# Patient Record
Sex: MALE | Race: WHITE | NOT HISPANIC OR LATINO | URBAN - METROPOLITAN AREA
[De-identification: names, ages, dates, MRNs, and addresses within clinical notes are randomized per-mention and may not be internally consistent; named-entity substitution may affect disease eponyms.]

---

## 2017-01-25 ENCOUNTER — OFFICE VISIT (OUTPATIENT)
Dept: FAMILY MEDICINE | Facility: CLINIC | Age: 67
End: 2017-01-25
Payer: MEDICARE

## 2017-01-25 ENCOUNTER — RADIANT APPOINTMENT (OUTPATIENT)
Dept: GENERAL RADIOLOGY | Facility: CLINIC | Age: 67
End: 2017-01-25
Attending: FAMILY MEDICINE
Payer: MEDICARE

## 2017-01-25 VITALS
DIASTOLIC BLOOD PRESSURE: 77 MMHG | WEIGHT: 215 LBS | HEART RATE: 90 BPM | BODY MASS INDEX: 28.49 KG/M2 | OXYGEN SATURATION: 98 % | TEMPERATURE: 98 F | HEIGHT: 73 IN | SYSTOLIC BLOOD PRESSURE: 112 MMHG

## 2017-01-25 DIAGNOSIS — Z13.6 CARDIOVASCULAR SCREENING; LDL GOAL LESS THAN 100: ICD-10-CM

## 2017-01-25 DIAGNOSIS — Z12.5 SCREENING FOR PROSTATE CANCER: ICD-10-CM

## 2017-01-25 DIAGNOSIS — R73.01 IMPAIRED FASTING GLUCOSE: ICD-10-CM

## 2017-01-25 DIAGNOSIS — R05.9 COUGH: ICD-10-CM

## 2017-01-25 DIAGNOSIS — R05.9 COUGH: Primary | ICD-10-CM

## 2017-01-25 DIAGNOSIS — Z11.9 SCREENING EXAMINATION FOR INFECTIOUS DISEASE: ICD-10-CM

## 2017-01-25 DIAGNOSIS — J20.9 ACUTE BRONCHITIS, UNSPECIFIED ORGANISM: ICD-10-CM

## 2017-01-25 DIAGNOSIS — R53.83 FATIGUE, UNSPECIFIED TYPE: ICD-10-CM

## 2017-01-25 LAB
ALBUMIN SERPL-MCNC: 3.8 G/DL (ref 3.4–5)
ALP SERPL-CCNC: 112 U/L (ref 40–150)
ALT SERPL W P-5'-P-CCNC: 25 U/L (ref 0–70)
ANION GAP SERPL CALCULATED.3IONS-SCNC: 10 MMOL/L (ref 3–14)
AST SERPL W P-5'-P-CCNC: 18 U/L (ref 0–45)
BASOPHILS # BLD AUTO: 0 10E9/L (ref 0–0.2)
BASOPHILS NFR BLD AUTO: 0.2 %
BILIRUB SERPL-MCNC: 0.5 MG/DL (ref 0.2–1.3)
BUN SERPL-MCNC: 16 MG/DL (ref 7–30)
CALCIUM SERPL-MCNC: 9.4 MG/DL (ref 8.5–10.1)
CHLORIDE SERPL-SCNC: 102 MMOL/L (ref 94–109)
CHOLEST SERPL-MCNC: 171 MG/DL
CO2 SERPL-SCNC: 26 MMOL/L (ref 20–32)
CREAT SERPL-MCNC: 1 MG/DL (ref 0.66–1.25)
DIFFERENTIAL METHOD BLD: NORMAL
EOSINOPHIL # BLD AUTO: 0.1 10E9/L (ref 0–0.7)
EOSINOPHIL NFR BLD AUTO: 0.5 %
ERYTHROCYTE [DISTWIDTH] IN BLOOD BY AUTOMATED COUNT: 13.2 % (ref 10–15)
GFR SERPL CREATININE-BSD FRML MDRD: 75 ML/MIN/1.7M2
GLUCOSE SERPL-MCNC: 106 MG/DL (ref 70–99)
HBA1C MFR BLD: 5.9 % (ref 4.3–6)
HCT VFR BLD AUTO: 46.8 % (ref 40–53)
HCV AB SERPL QL IA: NORMAL
HDLC SERPL-MCNC: 42 MG/DL
HGB BLD-MCNC: 16 G/DL (ref 13.3–17.7)
LDLC SERPL CALC-MCNC: 106 MG/DL
LYMPHOCYTES # BLD AUTO: 2 10E9/L (ref 0.8–5.3)
LYMPHOCYTES NFR BLD AUTO: 18.1 %
MCH RBC QN AUTO: 29.6 PG (ref 26.5–33)
MCHC RBC AUTO-ENTMCNC: 34.2 G/DL (ref 31.5–36.5)
MCV RBC AUTO: 87 FL (ref 78–100)
MONOCYTES # BLD AUTO: 0.8 10E9/L (ref 0–1.3)
MONOCYTES NFR BLD AUTO: 7.8 %
NEUTROPHILS # BLD AUTO: 7.9 10E9/L (ref 1.6–8.3)
NEUTROPHILS NFR BLD AUTO: 73.4 %
NONHDLC SERPL-MCNC: 129 MG/DL
PLATELET # BLD AUTO: 233 10E9/L (ref 150–450)
POTASSIUM SERPL-SCNC: 4.5 MMOL/L (ref 3.4–5.3)
PROT SERPL-MCNC: 8.1 G/DL (ref 6.8–8.8)
PSA SERPL-ACNC: 4.92 UG/L (ref 0–4)
RBC # BLD AUTO: 5.4 10E12/L (ref 4.4–5.9)
SODIUM SERPL-SCNC: 138 MMOL/L (ref 133–144)
TRIGL SERPL-MCNC: 115 MG/DL
TSH SERPL DL<=0.005 MIU/L-ACNC: 0.59 MU/L (ref 0.4–4)
WBC # BLD AUTO: 10.8 10E9/L (ref 4–11)

## 2017-01-25 PROCEDURE — G0103 PSA SCREENING: HCPCS | Performed by: FAMILY MEDICINE

## 2017-01-25 PROCEDURE — 83036 HEMOGLOBIN GLYCOSYLATED A1C: CPT | Performed by: FAMILY MEDICINE

## 2017-01-25 PROCEDURE — 80053 COMPREHEN METABOLIC PANEL: CPT | Performed by: FAMILY MEDICINE

## 2017-01-25 PROCEDURE — 71020 XR CHEST 2 VW: CPT

## 2017-01-25 PROCEDURE — 36415 COLL VENOUS BLD VENIPUNCTURE: CPT | Performed by: FAMILY MEDICINE

## 2017-01-25 PROCEDURE — 84443 ASSAY THYROID STIM HORMONE: CPT | Performed by: FAMILY MEDICINE

## 2017-01-25 PROCEDURE — 86803 HEPATITIS C AB TEST: CPT | Performed by: FAMILY MEDICINE

## 2017-01-25 PROCEDURE — 99214 OFFICE O/P EST MOD 30 MIN: CPT | Performed by: FAMILY MEDICINE

## 2017-01-25 PROCEDURE — 80061 LIPID PANEL: CPT | Performed by: FAMILY MEDICINE

## 2017-01-25 PROCEDURE — 85025 COMPLETE CBC W/AUTO DIFF WBC: CPT | Performed by: FAMILY MEDICINE

## 2017-01-25 RX ORDER — PREDNISONE 10 MG/1
20 TABLET ORAL DAILY
Qty: 14 TABLET | Refills: 1 | Status: SHIPPED | OUTPATIENT
Start: 2017-01-25 | End: 2017-02-01

## 2017-01-25 RX ORDER — AZITHROMYCIN 250 MG/1
TABLET, FILM COATED ORAL
Qty: 6 TABLET | Refills: 1 | Status: SHIPPED | OUTPATIENT
Start: 2017-01-25 | End: 2017-05-30

## 2017-01-25 ASSESSMENT — PAIN SCALES - GENERAL: PAINLEVEL: NO PAIN (0)

## 2017-01-25 NOTE — PROGRESS NOTES
SUBJECTIVE:                                                    eRnan Rossi is a 66 year old male who presents to clinic today for the following health issues:       ENT Symptoms             Symptoms: cc Present Absent Comment   Fever/Chills   x    Fatigue  x     Muscle Aches  x     Eye Irritation   x    Sneezing  x     Nasal Russ/Drg  x     Sinus Pressure/Pain   x    Loss of smell  x     Dental pain   x    Sore Throat   x    Swollen Glands   x    Ear Pain/Fullness   x    Cough  x     Wheeze  x     Chest Pain   x    Shortness of breath   x    Rash   x    Other         Symptom duration:  6 days   Symptom severity:  moderate   Treatments tried:  mucinex   Contacts:  none              Problem list and histories reviewed & adjusted, as indicated.  Additional history: as documented             HPI      ROS  Itchy throat initially    Then chest     Coughing up yellow/ green    No blood    Clear nasal discharge    Chest bothers patient more    Patient also would like labs done today.  Fasting.    Patient just got back from [place].    Physical Exam   Constitutional: He is oriented to person, place, and time and well-developed, well-nourished, and in no distress. No distress.   HENT:   Head: Normocephalic and atraumatic.   Right Ear: External ear normal.   Left Ear: External ear normal.   Mouth/Throat: Oropharynx is clear and moist.   Minimal nasal discharge.  Some drainage down back of oropharynx.  No sinus/ submandib tenderness   Eyes: Conjunctivae are normal.   Neck: Neck supple. Carotid bruit is not present.   Cardiovascular: Normal rate, regular rhythm, normal heart sounds and intact distal pulses.  Exam reveals no gallop and no friction rub.    No murmur heard.  Pulmonary/Chest: Effort normal. No respiratory distress. He has no wheezes. He has no rales. He exhibits no tenderness.   Coarse breath sounds all fields   Abdominal: Soft. There is no tenderness.   No back or costovertebral angle tenderness      Musculoskeletal: He exhibits no edema.   Lymphadenopathy:     He has no cervical adenopathy.   Neurological: He is alert and oriented to person, place, and time.   Skin: He is not diaphoretic.   Psychiatric: Mood and affect normal.       cxr showed no obvious infiltrate.  A bit of fluid in the fissures.  No masses seen.    ASSESSMENT / PLAN:  (R05) Cough  (primary encounter diagnosis)  Comment: cxr as above   Plan: XR Chest 2 Views             (R73.01) Impaired fasting glucose  Comment: check   Plan: Hemoglobin A1c             (R53.83) Fatigue, unspecified type  Comment: check   Plan: TSH with free T4 reflex, Comprehensive         metabolic panel, CBC with platelets         differential             (Z11.9) Screening examination for infectious disease  Comment: check   Plan: Hepatitis C antibody             (Z13.6) CARDIOVASCULAR SCREENING; LDL GOAL LESS THAN 100  Comment: fasting today   Plan: Lipid panel reflex to direct LDL             (Z12.5) Screening for prostate cancer  Comment: elevated in past   Plan: Prostate spec antigen screen        Recheck     (J20.9) Acute bronchitis, unspecified organism  Comment: start with prednisone.  Then if symptoms worsen/ not improving can fill prescription for antibiotic   Plan: predniSONE (DELTASONE) 10 MG tablet,         azithromycin (ZITHROMAX) 250 MG tablet               I reviewed the patient's medications, allergies, medical history, family history, and social history.    Madhu José MD

## 2017-01-25 NOTE — MR AVS SNAPSHOT
"              After Visit Summary   1/25/2017    Renan Rossi    MRN: [id number]           Patient Information     Date Of Birth          1950        Visit Information        Provider Department      1/25/2017 7:00 AM Madhu José MD [facility]        Today's Diagnoses     Cough    -  1     Impaired fasting glucose         Fatigue, unspecified type         Screening examination for infectious disease         CARDIOVASCULAR SCREENING; LDL GOAL LESS THAN 100         Screening for prostate cancer         Acute bronchitis, unspecified organism           Care Instructions    Start prednisone    Hold antibiotics ; fill if symptoms worsen/ don't improve    We will send you lab results    Follow up as needed based on symptoms         Follow-ups after your visit        Who to contact     If you have questions or need follow up information about today's clinic visit or your schedule please contact [facility] directly at [phone].  Normal or non-critical lab and imaging results will be communicated to you by MyChart, letter or phone within 4 business days after the clinic has received the results. If you do not hear from us within 7 days, please contact the clinic through MyChart or phone. If you have a critical or abnormal lab result, we will notify you by phone as soon as possible.  Submit refill requests through [employer] or call your pharmacy and they will forward the refill request to us. Please allow 3 business days for your refill to be completed.          Additional Information About Your Visit        MyChart Information     [employer] lets you send messages to your doctor, view your test results, renew your prescriptions, schedule appointments and more. To sign up, go to [email or web address] . Click on \"Log in\" on the left side of the screen, which will take you to the Welcome page. Then click on \"Sign up Now\" on the right side of the page.     You will be asked " "to enter the access code listed below, as well as some personal information. Please follow the directions to create your username and password.     Your access code is: [email or web address]  Expires: 2017  7:49 AM     Your access code will  in 90 days. If you need help or a new code, please call your [facility] clinic or [phone].        Care EveryWhere ID     This is your Care EveryWhere ID. This could be used by other organizations to access your [facility] medical records  [id number]        Your Vitals Were     Pulse Temperature Height BMI (Body Mass Index) Pulse Oximetry       90 98  F (36.7  C) (Oral) 6' 1\" (1.854 m) 28.37 kg/m2 98%        Blood Pressure from Last 3 Encounters:   17 112/77   16 127/77   08/18/15 117/76    Weight from Last 3 Encounters:   17 215 lb (97.523 kg)   16 223 lb (101.152 kg)   08/18/15 223 lb (101.152 kg)              We Performed the Following     CBC with platelets differential     Comprehensive metabolic panel     Hemoglobin A1c     Hepatitis C antibody     Lipid panel reflex to direct LDL     Prostate spec antigen screen     TSH with free T4 reflex          Today's Medication Changes          These changes are accurate as of: 17  7:49 AM.  If you have any questions, ask your nurse or doctor.               Start taking these medicines.        Dose/Directions    azithromycin 250 MG tablet   Commonly known as:  ZITHROMAX   Used for:  Acute bronchitis, unspecified organism   Started by:  Madhu José MD        2 po daily x one day then 1 po daily x 4 days   Quantity:  6 tablet   Refills:  1       predniSONE 10 MG tablet   Commonly known as:  DELTASONE   Used for:  Acute bronchitis, unspecified organism   Started by:  Madhu José MD        Dose:  20 mg   Take 2 tablets (20 mg) by mouth daily for 7 days   Quantity:  14 tablet   Refills:  1            Where to get your medicines      These medications were sent to [id number] - " [place], [place] - [address]  [address], [place] [address]     Phone:  [phone]    - predniSONE 10 MG tablet      Some of these will need a paper prescription and others can be bought over the counter.  Ask your nurse if you have questions.     Bring a paper prescription for each of these medications    - azithromycin 250 MG tablet             Primary Care Provider Office Phone # Fax #    Madhu José [phone]       [facility] [address]  [facility] [address]        Thank you!     Thank you for choosing [facility]  for your care. Our goal is always to provide you with excellent care. Hearing back from our patients is one way we can continue to improve our services. Please take a few minutes to complete the written survey that you may receive in the mail after your visit with us. Thank you!             Your Updated Medication List - Protect others around you: Learn how to safely use, store and throw away your medicines at www.disposemymeds.org.          This list is accurate as of: 1/25/17  7:49 AM.  Always use your most recent med list.                   Brand Name Dispense Instructions for use    aspirin 81 MG tablet     30 tablet    Take 1 tablet (81 mg) by mouth daily       azithromycin 250 MG tablet    ZITHROMAX    6 tablet    2 po daily x one day then 1 po daily x 4 days       predniSONE 10 MG tablet    DELTASONE    14 tablet    Take 2 tablets (20 mg) by mouth daily for 7 days

## 2017-01-25 NOTE — PATIENT INSTRUCTIONS
Start prednisone    Hold antibiotics ; fill if symptoms worsen/ don't improve    We will send you lab results    Follow up as needed based on symptoms

## 2017-01-25 NOTE — Clinical Note
"[facility]  [address] [place]  [address]  [phone]        January 30, 2017    Renan Rossi  [address] [facility] [address]        Dear Renan,    The prostate test is better than last time.    Don't worry about the LDL \"bad\" cholesterol being marked as high.  The lab now considers normal less than 100, which is overly stringent.  Normal used to be less than 130.     The normal hemoglobin a1c means you do not have diabetes.    Other labs are okay also.    Results for orders placed or performed in visit on 01/25/17   Lipid panel reflex to direct LDL   Result Value Ref Range    Cholesterol 171 <200 mg/dL    Triglycerides 115 <150 mg/dL    HDL Cholesterol 42 >39 mg/dL    LDL Cholesterol Calculated 106 (H) <100 mg/dL    Non HDL Cholesterol 129 <130 mg/dL   Hemoglobin A1c   Result Value Ref Range    Hemoglobin A1C 5.9 4.3 - 6.0 %   TSH with free T4 reflex   Result Value Ref Range    TSH 0.59 0.40 - 4.00 mU/L   Prostate spec antigen screen   Result Value Ref Range    PSA 4.92 (H) 0 - 4 ug/L   Comprehensive metabolic panel   Result Value Ref Range    Sodium 138 133 - 144 mmol/L    Potassium 4.5 3.4 - 5.3 mmol/L    Chloride 102 94 - 109 mmol/L    Carbon Dioxide 26 20 - 32 mmol/L    Anion Gap 10 3 - 14 mmol/L    Glucose 106 (H) 70 - 99 mg/dL    Urea Nitrogen 16 7 - 30 mg/dL    Creatinine 1.00 0.66 - 1.25 mg/dL    GFR Estimate 75 >60 mL/min/1.7m2    GFR Estimate If Black >90   GFR Calc   >60 mL/min/1.7m2    Calcium 9.4 8.5 - 10.1 mg/dL    Bilirubin Total 0.5 0.2 - 1.3 mg/dL    Albumin 3.8 3.4 - 5.0 g/dL    Protein Total 8.1 6.8 - 8.8 g/dL    Alkaline Phosphatase 112 40 - 150 U/L    ALT 25 0 - 70 U/L    AST 18 0 - 45 U/L   CBC with platelets differential   Result Value Ref Range    WBC 10.8 4.0 - 11.0 10e9/L    RBC Count 5.40 4.4 - 5.9 10e12/L    Hemoglobin 16.0 13.3 - 17.7 g/dL    Hematocrit 46.8 40.0 - 53.0 %    MCV 87 78 - 100 fl    MCH 29.6 26.5 - " 33.0 pg    MCHC 34.2 31.5 - 36.5 g/dL    RDW 13.2 10.0 - 15.0 %    Platelet Count 233 150 - 450 10e9/L    Diff Method Automated Method     % Neutrophils 73.4 %    % Lymphocytes 18.1 %    % Monocytes 7.8 %    % Eosinophils 0.5 %    % Basophils 0.2 %    Absolute Neutrophil 7.9 1.6 - 8.3 10e9/L    Absolute Lymphocytes 2.0 0.8 - 5.3 10e9/L    Absolute Monocytes 0.8 0.0 - 1.3 10e9/L    Absolute Eosinophils 0.1 0.0 - 0.7 10e9/L    Absolute Basophils 0.0 0.0 - 0.2 10e9/L   Hepatitis C antibody   Result Value Ref Range    Hepatitis C Antibody  NR     Nonreactive   Assay performance characteristics have not been established for newborns,   infants, and children         If you have any questions please call the clinic at [phone].    Sincerely,    Madhu OWENSL

## 2017-01-25 NOTE — NURSING NOTE
"Chief Complaint   Patient presents with     Cold Symptoms     Health Maintenance     Other     bpa and my chart       Initial /77 mmHg  Pulse 90  Temp(Src) 98  F (36.7  C) (Oral)  Ht 6' 1\" (1.854 m)  Wt 215 lb (97.523 kg)  BMI 28.37 kg/m2  SpO2 98% Estimated body mass index is 28.37 kg/(m^2) as calculated from the following:    Height as of this encounter: 6' 1\" (1.854 m).    Weight as of this encounter: 215 lb (97.523 kg).  BP completed using cuff size: regular taken on the right arm  Linsey Lr CMA      "

## 2017-01-29 NOTE — PROGRESS NOTES
"Quick Note:    The prostate test is better than last time.    Don't worry about the LDL \"bad\" cholesterol being marked as high. The lab now considers normal less than 100, which is overly stringent. Normal used to be less than 130.     The normal hemoglobin a1c means you do not have diabetes.    Other labs are okay also.    Madhu José MD      ______  "

## 2017-05-30 ENCOUNTER — OFFICE VISIT (OUTPATIENT)
Dept: FAMILY MEDICINE | Facility: CLINIC | Age: 67
End: 2017-05-30
Payer: MEDICARE

## 2017-05-30 VITALS
HEART RATE: 83 BPM | BODY MASS INDEX: 28.5 KG/M2 | SYSTOLIC BLOOD PRESSURE: 130 MMHG | DIASTOLIC BLOOD PRESSURE: 78 MMHG | OXYGEN SATURATION: 98 % | TEMPERATURE: 97.3 F | WEIGHT: 216 LBS

## 2017-05-30 DIAGNOSIS — H61.23 BILATERAL IMPACTED CERUMEN: Primary | ICD-10-CM

## 2017-05-30 PROCEDURE — 99212 OFFICE O/P EST SF 10 MIN: CPT | Performed by: NURSE PRACTITIONER

## 2017-05-30 ASSESSMENT — PAIN SCALES - GENERAL: PAINLEVEL: NO PAIN (0)

## 2017-05-30 NOTE — PROGRESS NOTES
SUBJECTIVE:                                                    Renan Rossi is a 67 year old male who presents to clinic today for the following health issues:      Acute Illness   Acute illness concerns: Left ear  Onset: 2 dasy    Fever: no    Chills/Sweats: no    Headache (location?): no    Sinus Pressure:no    Conjunctivitis:  no    Ear Pain: feels plugged    Rhinorrhea: no    Congestion: YES- a little bit of nasal  congestion    Sore Throat: no     Cough: no    Wheeze: no    Decreased Appetite: no    Nausea: no    Vomiting: no    Diarrhea:  no    Dysuria/Freq.: no    Fatigue/Achiness: no    Sick/Strep Exposure: no     Therapies Tried and outcome: nothing    No pain. Just pressure. Thinks ears plugged with wax      Problem list and histories reviewed & adjusted, as indicated.  Additional history: none    Patient Active Problem List   Diagnosis     Advanced directives, counseling/discussion     Past Surgical History:   Procedure Laterality Date     C NONSPECIFIC PROCEDURE  1975    right kidney, blockage     COLONOSCOPY  11-21-13    Return for Colonoscopy In 1 Year     COLONOSCOPY  1-7-15    Return for Colonoscopy in 10 yrs       Social History   Substance Use Topics     Smoking status: Former Smoker     Types: Cigarettes     Quit date: 7/15/2012     Smokeless tobacco: Never Used     Alcohol use No     Family History   Problem Relation Age of Onset     Cancer - colorectal Mother            Reviewed and updated as needed this visit by clinical staff  Tobacco  Allergies  Med Hx  Surg Hx  Fam Hx  Soc Hx      Reviewed and updated as needed this visit by Provider         ROS:  Noncontributory except as above    OBJECTIVE:                                                    /78 (BP Location: Right arm, Patient Position: Chair, Cuff Size: Adult Large)  Pulse 83  Temp 97.3  F (36.3  C) (Oral)  Wt 216 lb (98 kg)  SpO2 98%  BMI 28.5 kg/m2  Body mass index is 28.5 kg/(m^2).  GENERAL: healthy, alert and no  distress  HENT: normal cephalic/atraumatic, both ears: impacted with cerumen. After lavage: normal: no effusions, no erythema, normal landmarks, nose and mouth without ulcers or lesions, oropharynx clear, oral mucous membranes moist and sinuses: not tender    Diagnostic Test Results:  none      ASSESSMENT/PLAN:                                                        ICD-10-CM    1. Bilateral impacted cerumen H61.23        Bilateral ears lavaged by MA, which he tolerated. Voiced resolution of symptoms afterwards.     CLARISA Emerson CNP  [facility]

## 2017-05-30 NOTE — MR AVS SNAPSHOT
"              After Visit Summary   2017    Renan Rossi    MRN: [id number]           Patient Information     Date Of Birth          1950        Visit Information        Provider Department      2017 9:40 AM Shameka Guthrie APRN CNP [facility]        Today's Diagnoses     Bilateral impacted cerumen    -  1       Follow-ups after your visit        Follow-up notes from your care team     Return if symptoms worsen or fail to improve.      Who to contact     If you have questions or need follow up information about today's clinic visit or your schedule please contact [facility] directly at [phone].  Normal or non-critical lab and imaging results will be communicated to you by MyChart, letter or phone within 4 business days after the clinic has received the results. If you do not hear from us within 7 days, please contact the clinic through MyChart or phone. If you have a critical or abnormal lab result, we will notify you by phone as soon as possible.  Submit refill requests through [employer] or call your pharmacy and they will forward the refill request to us. Please allow 3 business days for your refill to be completed.          Additional Information About Your Visit        MyChart Information     [employer] lets you send messages to your doctor, view your test results, renew your prescriptions, schedule appointments and more. To sign up, go to [email or web address] . Click on \"Log in\" on the left side of the screen, which will take you to the Welcome page. Then click on \"Sign up Now\" on the right side of the page.     You will be asked to enter the access code listed below, as well as some personal information. Please follow the directions to create your username and password.     Your access code is: [email or web address]  Expires: 2017 10:13 AM     Your access code will  in 90 days. If you need help or a new code, please call your [facility] " clinic or [phone].        Care EveryWhere ID     This is your Care EveryWhere ID. This could be used by other organizations to access your [facility] medical records  [id number]        Your Vitals Were     Pulse Temperature Pulse Oximetry BMI (Body Mass Index)          83 97.3  F (36.3  C) (Oral) 98% 28.5 kg/m2         Blood Pressure from Last 3 Encounters:   05/30/17 130/78   01/25/17 112/77   02/01/16 127/77    Weight from Last 3 Encounters:   05/30/17 216 lb (98 kg)   01/25/17 215 lb (97.5 kg)   02/01/16 223 lb (101.2 kg)              Today, you had the following     No orders found for display       Primary Care Provider Office Phone # Fax #    Madhu José [phone]       [facility] [address]  [facility] [address]        Thank you!     Thank you for choosing [facility]  for your care. Our goal is always to provide you with excellent care. Hearing back from our patients is one way we can continue to improve our services. Please take a few minutes to complete the written survey that you may receive in the mail after your visit with us. Thank you!             Your Updated Medication List - Protect others around you: Learn how to safely use, store and throw away your medicines at www.disposemymeds.org.          This list is accurate as of: 5/30/17 10:13 AM.  Always use your most recent med list.                   Brand Name Dispense Instructions for use    aspirin 81 MG tablet     30 tablet    Take 1 tablet (81 mg) by mouth daily

## 2017-05-30 NOTE — NURSING NOTE
"Chief Complaint   Patient presents with     Ear Problem     Left ear problem       Initial /78 (BP Location: Right arm, Patient Position: Chair, Cuff Size: Adult Large)  Pulse 83  Temp 97.3  F (36.3  C) (Oral)  Wt 216 lb (98 kg)  SpO2 98%  BMI 28.5 kg/m2 Estimated body mass index is 28.5 kg/(m^2) as calculated from the following:    Height as of 1/25/17: 6' 1\" (1.854 m).    Weight as of this encounter: 216 lb (98 kg).  Medication Reconciliation: complete    "

## 2017-07-20 ENCOUNTER — OFFICE VISIT (OUTPATIENT)
Dept: FAMILY MEDICINE | Facility: CLINIC | Age: 67
End: 2017-07-20
Payer: MEDICARE

## 2017-07-20 VITALS
OXYGEN SATURATION: 99 % | DIASTOLIC BLOOD PRESSURE: 83 MMHG | SYSTOLIC BLOOD PRESSURE: 122 MMHG | BODY MASS INDEX: 28.15 KG/M2 | TEMPERATURE: 97.7 F | HEART RATE: 83 BPM | WEIGHT: 213.38 LBS

## 2017-07-20 DIAGNOSIS — J30.9 ALLERGIC RHINITIS, UNSPECIFIED CHRONICITY, UNSPECIFIED SEASONALITY, UNSPECIFIED TRIGGER: ICD-10-CM

## 2017-07-20 DIAGNOSIS — Z91.89 PNEUMOCOCCAL VACCINATION INDICATED: Primary | ICD-10-CM

## 2017-07-20 DIAGNOSIS — H69.92 DYSFUNCTION OF EUSTACHIAN TUBE, LEFT: ICD-10-CM

## 2017-07-20 DIAGNOSIS — H65.92 OTITIS MEDIA WITH EFFUSION, LEFT: ICD-10-CM

## 2017-07-20 PROCEDURE — G0009 ADMIN PNEUMOCOCCAL VACCINE: HCPCS | Performed by: FAMILY MEDICINE

## 2017-07-20 PROCEDURE — 90732 PPSV23 VACC 2 YRS+ SUBQ/IM: CPT | Performed by: FAMILY MEDICINE

## 2017-07-20 PROCEDURE — 99213 OFFICE O/P EST LOW 20 MIN: CPT | Mod: 25 | Performed by: FAMILY MEDICINE

## 2017-07-20 RX ORDER — AMOXICILLIN 500 MG/1
500 CAPSULE ORAL 3 TIMES DAILY
Qty: 30 CAPSULE | Refills: 0 | Status: SHIPPED | OUTPATIENT
Start: 2017-07-20 | End: 2018-03-12

## 2017-07-20 ASSESSMENT — PAIN SCALES - GENERAL: PAINLEVEL: NO PAIN (0)

## 2017-07-20 NOTE — PROGRESS NOTES
SUBJECTIVE:                                                    Renan Rossi is a 67 year old male who presents to clinic today for the following health issues:       Having problems hearing out of his left ear    none    Problem list and histories reviewed & adjusted, as indicated.  Additional history: as documented         Reviewed and updated as needed this visit by clinical staff  Tobacco  Allergies  Meds  Problems  Med Hx  Surg Hx  Fam Hx  Soc Hx        Reviewed and updated as needed this visit by Provider          working on kitchen at home       Late May had some cleaned out    No history of ear infections     No chest pain/ dyspnea    No fever/ chills     No problems breathing through nose    No history of environmental allergies    Physical Exam   HENT:   Head: Normocephalic and atraumatic.   Right Ear: External ear normal.   Left Ear: External ear normal.   Ear canals both fine, minimal wax present.  Right tympanic membrane appears normal.  Left is dull gray, no light reflex seen.  No movement with insufflation.  Nasal mucosa red/ swollen.  No sinus/ submandib tenderness.   Eyes: Conjunctivae are normal.   Neck: Neck supple.   Cardiovascular: Normal rate and regular rhythm.    Pulmonary/Chest: Effort normal and breath sounds normal. No respiratory distress.       ASSESSMENT / PLAN:  (Z91.89) Pneumococcal vaccination indicated  (primary encounter diagnosis)  Comment: needs   Plan: VACCINE ADMINISTRATION, INITIAL, PNEUMOCOCCAL         VACCINE,ADULT,SQ OR IM        Given     (H69.82) Dysfunction of eustachian tube, left  Comment: discussed possible use of decongestant like sudafed prn incl side effects   Plan: OTOLARYNGOLOGY REFERRAL             (H65.92) Otitis media with effusion, left  Comment: given duration of this, will do therapeutic trial of antibiotic   Plan: amoxicillin (AMOXIL) 500 MG capsule,         OTOLARYNGOLOGY REFERRAL                (J30.9) Allergic rhinitis, unspecified chronicity,  unspecified seasonality, unspecified trigger  Comment: could consider over the counter nasal steroid spray also   Plan: as above       Follow up with ent if symptoms not resolving    Patient agreed with plan      I reviewed the patient's medications, allergies, medical history, family history, and social history.    Madhu José MD

## 2017-07-20 NOTE — NURSING NOTE
"Chief Complaint   Patient presents with     Hearing Problem     Health Maintenance       Initial /83 (BP Location: Right arm, Patient Position: Chair, Cuff Size: Adult Regular)  Pulse 83  Temp 97.7  F (36.5  C) (Oral)  Wt 213 lb 6 oz (96.8 kg)  SpO2 99%  BMI 28.15 kg/m2 Estimated body mass index is 28.15 kg/(m^2) as calculated from the following:    Height as of 1/25/17: 6' 1\" (1.854 m).    Weight as of this encounter: 213 lb 6 oz (96.8 kg).  Medication Reconciliation: complete   Linsey Lr CMA      "

## 2017-07-20 NOTE — MR AVS SNAPSHOT
After Visit Summary   7/20/2017    Renan Rossi    MRN: [id number]           Patient Information     Date Of Birth          1950        Visit Information        Provider Department      7/20/2017 6:40 AM Madhu José MD [facility]'s Diagnoses     Pneumococcal vaccination indicated    -  1    Dysfunction of eustachian tube, left        Otitis media with effusion, left        Allergic rhinitis, unspecified chronicity, unspecified seasonality, unspecified trigger          Care Instructions    Can take the antibiotics     Consider non prescription sudafed ( pseudoephedrine ) as needed for eustacian tube dysfunction     Or over the counter nasal steroid spray like flonase ( fluticasone ) once daily, 1-2 sprays each nostril    If symptoms not resolving then see ENT           Follow-ups after your visit        Additional Services     OTOLARYNGOLOGY REFERRAL       Your provider has referred you to: [facility]: [facility] [phone]   [email or web address]  [facility]: [facility] ([facility]) [facility] [phone]  [email or web address]  UMP: [facility] [phone]   [email or web address]    Please be aware that coverage of these services is subject to the terms and limitations of your health insurance plan.  Call member services at your health plan with any benefit or coverage questions.      Please bring the following with you to your appointment:    (1) Any X-Rays, CTs or MRIs which have been performed.  Contact the facility where they were done to arrange for  prior to your scheduled appointment.   (2) List of current medications  (3) This referral request   (4) Any documents/labs given to you for this referral                  Who to contact     If you have questions  "or need follow up information about today's clinic visit or your schedule please contact [facility] directly at [phone].  Normal or non-critical lab and imaging results will be communicated to you by MyChart, letter or phone within 4 business days after the clinic has received the results. If you do not hear from us within 7 days, please contact the clinic through [employer] or phone. If you have a critical or abnormal lab result, we will notify you by phone as soon as possible.  Submit refill requests through [employer] or call your pharmacy and they will forward the refill request to us. Please allow 3 business days for your refill to be completed.          Additional Information About Your Visit        [employer] Information     [employer] lets you send messages to your doctor, view your test results, renew your prescriptions, schedule appointments and more. To sign up, go to [email or web address] . Click on \"Log in\" on the left side of the screen, which will take you to the Welcome page. Then click on \"Sign up Now\" on the right side of the page.     You will be asked to enter the access code listed below, as well as some personal information. Please follow the directions to create your username and password.     Your access code is: [email or web address]  Expires: 2017 10:13 AM     Your access code will  in 90 days. If you need help or a new code, please call your [place] clinic or [phone].        Care EveryWhere ID     This is your Care EveryWhere ID. This could be used by other organizations to access your [place] medical records  [id number]        Your Vitals Were     Pulse Temperature Pulse Oximetry BMI (Body Mass Index)          83 97.7  F (36.5  C) (Oral) 99% 28.15 kg/m2         Blood Pressure from Last 3 Encounters:   17 122/83   17 130/78   17 112/77    Weight from Last 3 Encounters:   17 213 lb 6 oz (96.8 kg)   17 216 lb (98 kg)   17 215 " lb (97.5 kg)              We Performed the Following     OTOLARYNGOLOGY REFERRAL     PNEUMOCOCCAL VACCINE,ADULT,SQ OR IM     VACCINE ADMINISTRATION, INITIAL          Today's Medication Changes          These changes are accurate as of: 7/20/17  6:58 AM.  If you have any questions, ask your nurse or doctor.               Start taking these medicines.        Dose/Directions    amoxicillin 500 MG capsule   Commonly known as:  AMOXIL   Used for:  Otitis media with effusion, left   Started by:  Madhu José MD        Dose:  500 mg   Take 1 capsule (500 mg) by mouth 3 times daily   Quantity:  30 capsule   Refills:  0            Where to get your medicines      These medications were sent to [id number] - [place], [place] - [address] AT [facility] [address]  [address], [place] [address]     Phone:  [phone]     amoxicillin 500 MG capsule                Primary Care Provider Office Phone # Fax #    Madhu José [phone]       [facility] [address]  [facility] [address]        Equal Access to Services     [facility]: Hadii aad ku hadasho Soomaali, waaxda luqadaha, qaybta kaalmada adeegyada, waxay idiin hayaan aislinn peterson . So [facility] [phone].    ATENCIÓN: Si habla español, tiene a betancourt disposición servicios gratuitos de asistencia lingüística. Llame al [phone].    We comply with applicable federal civil rights laws and [place] laws. We do not discriminate on the basis of race, color, national origin, age, disability sex, sexual orientation or gender identity.            Thank you!     Thank you for choosing [facility]  for your care. Our goal is always to provide you with excellent care. Hearing back from our patients is one way we can continue to improve our services. Please take a few minutes to complete the written survey that you may receive in the mail after  your visit with us. Thank you!             Your Updated Medication List - Protect others around you: Learn how to safely use, store and throw away your medicines at www.disposemymeds.org.          This list is accurate as of: 7/20/17  6:58 AM.  Always use your most recent med list.                   Brand Name Dispense Instructions for use Diagnosis    amoxicillin 500 MG capsule    AMOXIL    30 capsule    Take 1 capsule (500 mg) by mouth 3 times daily    Otitis media with effusion, left       aspirin 81 MG tablet     30 tablet    Take 1 tablet (81 mg) by mouth daily    Health care maintenance

## 2017-07-20 NOTE — PATIENT INSTRUCTIONS
Can take the antibiotics     Consider non prescription sudafed ( pseudoephedrine ) as needed for eustacian tube dysfunction     Or over the counter nasal steroid spray like flonase ( fluticasone ) once daily, 1-2 sprays each nostril    If symptoms not resolving then see ENT

## 2017-07-20 NOTE — NURSING NOTE
Prior to injection verified patient identity using patient's name and date of birth.    Screening Questionnaire for Adult Immunization    Are you sick today?   No   Do you have allergies to medications, food, a vaccine component or latex?   No   Have you ever had a serious reaction after receiving a vaccination?   No   Do you have a long-term health problem with heart disease, lung disease, asthma, kidney disease, metabolic disease (e.g. diabetes), anemia, or other blood disorder?   No   Do you have cancer, leukemia, HIV/AIDS, or any other immune system problem?   No   In the past 3 months, have you taken medications that affect  your immune system, such as prednisone, other steroids, or anticancer drugs; drugs for the treatment of rheumatoid arthritis, Crohn s disease, or psoriasis; or have you had radiation treatments?   No   Have you had a seizure, or a brain or other nervous system problem?   No   During the past year, have you received a transfusion of blood or blood     products, or been given immune (gamma) globulin or antiviral drug?   No   For women: Are you pregnant or is there a chance you could become        pregnant during the next month?   No   Have you received any vaccinations in the past 4 weeks?   No     Immunization questionnaire answers were all negative.      MNVFC doesn't apply on this patient    Per orders of Dr. José, injection of pneumoccoccal given by Linsey Lr. Patient instructed to remain in clinic for 15 minutes afterwards, and to report any adverse reaction to me immediately.       Screening performed by Linsey Lr on 7/20/2017 at 7:09 AM.

## 2017-10-31 ENCOUNTER — ALLIED HEALTH/NURSE VISIT (OUTPATIENT)
Dept: NURSING | Facility: CLINIC | Age: 67
End: 2017-10-31
Payer: MEDICARE

## 2017-10-31 DIAGNOSIS — Z23 NEED FOR PROPHYLACTIC VACCINATION AND INOCULATION AGAINST INFLUENZA: Primary | ICD-10-CM

## 2017-10-31 PROCEDURE — 99207 ZZC NO CHARGE NURSE ONLY: CPT

## 2017-10-31 PROCEDURE — 90662 IIV NO PRSV INCREASED AG IM: CPT

## 2017-10-31 PROCEDURE — G0008 ADMIN INFLUENZA VIRUS VAC: HCPCS

## 2017-10-31 NOTE — MR AVS SNAPSHOT
"              After Visit Summary   10/31/2017    Renan Rossi    MRN: [id number]           Patient Information     Date Of Birth          1950        Visit Information        Provider Department      10/31/2017 8:05 AM [facility] FLU CL[facility]IC [facility]        Today's Diagnoses     Need for prophylactic vaccination and inoculation against influenza    -  1       Follow-ups after your visit        Your next 10 appointments already scheduled     Oct 31, 2017  8:05 AM CDT   Nurse Only with  FLU CLINIC   [facility]    [address] [facility] [address] [phone]              Who to contact     If you have questions or need follow up information about today's clinic visit or your schedule please contact [facility] directly at [phone].  Normal or non-critical lab and imaging results will be communicated to you by MyChart, letter or phone within 4 business days after the clinic has received the results. If you do not hear from us within 7 days, please contact the clinic through MyChart or phone. If you have a critical or abnormal lab result, we will notify you by phone as soon as possible.  Submit refill requests through [employer] or call your pharmacy and they will forward the refill request to us. Please allow 3 business days for your refill to be completed.          Additional Information About Your Visit        MyChart Information     [employer] lets you send messages to your doctor, view your test results, renew your prescriptions, schedule appointments and more. To sign up, go to [email or web address] . Click on \"Log in\" on the left side of the screen, which will take you to the Welcome page. Then click on \"Sign up Now\" on the right side of the page.     You will be asked to enter the access code listed below, as well as some personal information. Please follow the directions to " create your username and password.     Your access code is: [email or web address]  Expires: 2018  8:00 AM     Your access code will  in 90 days. If you need help or a new code, please call your [place] clinic or [phone].        Care EveryWhere ID     This is your Care EveryWhere ID. This could be used by other organizations to access your [place] medical records  [id number]         Blood Pressure from Last 3 Encounters:   17 122/83   17 130/78   17 112/77    Weight from Last 3 Encounters:   17 213 lb 6 oz (96.8 kg)   17 216 lb (98 kg)   17 215 lb (97.5 kg)              We Performed the Following     FLU VACCINE, INCREASED ANTIGEN, PRESV FREE, AGE 65+ [66139]     Vaccine Administration, Initial [96478]        Primary Care Provider Office Phone # Fax #    Madhu José [phone]       [address] [facility] [address]        Equal Access to Services     [facility] : Hadii aad ku hadasho Soomaali, waaxda luqadaha, qaybta kaalmada adeegyada, sivakumar peterson . So [facility] [phone].    ATENCIÓN: Si habla español, tiene a betancourt disposición servicios gratuitos de asistencia lingüística. Daniela al [phone].    We comply with applicable federal civil rights laws and [place] laws. We do not discriminate on the basis of race, color, national origin, age, disability, sex, sexual orientation, or gender identity.            Thank you!     Thank you for choosing [facility]  for your care. Our goal is always to provide you with excellent care. Hearing back from our patients is one way we can continue to improve our services. Please take a few minutes to complete the written survey that you may receive in the mail after your visit with us. Thank you!             Your Updated Medication List - Protect others around you: Learn how to safely use, store and throw away your medicines at  www.disposemymeds.org.          This list is accurate as of: 10/31/17  8:00 AM.  Always use your most recent med list.                   Brand Name Dispense Instructions for use Diagnosis    amoxicillin 500 MG capsule    AMOXIL    30 capsule    Take 1 capsule (500 mg) by mouth 3 times daily    Otitis media with effusion, left       aspirin 81 MG tablet     30 tablet    Take 1 tablet (81 mg) by mouth daily    Health care maintenance

## 2018-03-12 ENCOUNTER — OFFICE VISIT (OUTPATIENT)
Dept: FAMILY MEDICINE | Facility: CLINIC | Age: 68
End: 2018-03-12
Payer: MEDICARE

## 2018-03-12 VITALS
TEMPERATURE: 97.6 F | DIASTOLIC BLOOD PRESSURE: 73 MMHG | BODY MASS INDEX: 29.27 KG/M2 | OXYGEN SATURATION: 96 % | SYSTOLIC BLOOD PRESSURE: 121 MMHG | HEIGHT: 72 IN | WEIGHT: 216.13 LBS | HEART RATE: 70 BPM

## 2018-03-12 DIAGNOSIS — J01.90 ACUTE SINUSITIS WITH SYMPTOMS > 10 DAYS: Primary | ICD-10-CM

## 2018-03-12 PROCEDURE — 99213 OFFICE O/P EST LOW 20 MIN: CPT | Performed by: FAMILY MEDICINE

## 2018-03-12 RX ORDER — AMOXICILLIN 500 MG/1
500 CAPSULE ORAL 3 TIMES DAILY
Qty: 30 CAPSULE | Refills: 0 | Status: SHIPPED | OUTPATIENT
Start: 2018-03-12 | End: 2018-07-10

## 2018-03-12 ASSESSMENT — PAIN SCALES - GENERAL: PAINLEVEL: NO PAIN (0)

## 2018-03-12 NOTE — MR AVS SNAPSHOT
"              After Visit Summary   3/12/2018    Renan Rossi    MRN: [id number]           Patient Information     Date Of Birth          1950        Visit Information        Provider Department      3/12/2018 8:40 AM Madhu José MD [facility]        Today's Diagnoses     Acute sinusitis with symptoms > 10 days    -  1      Care Instructions    Stay well hydrated    mucinex as needed    Hold prescription for antibiotic; fill if symptoms worsen    Stay well hydrated              Follow-ups after your visit        Who to contact     If you have questions or need follow up information about today's clinic visit or your schedule please contact [facility] directly at [phone].  Normal or non-critical lab and imaging results will be communicated to you by MyChart, letter or phone within 4 business days after the clinic has received the results. If you do not hear from us within 7 days, please contact the clinic through MyChart or phone. If you have a critical or abnormal lab result, we will notify you by phone as soon as possible.  Submit refill requests through [employer] or call your pharmacy and they will forward the refill request to us. Please allow 3 business days for your refill to be completed.          Additional Information About Your Visit        MyChart Information     [employer] lets you send messages to your doctor, view your test results, renew your prescriptions, schedule appointments and more. To sign up, go to [email or web address] . Click on \"Log in\" on the left side of the screen, which will take you to the Welcome page. Then click on \"Sign up Now\" on the right side of the page.     You will be asked to enter the access code listed below, as well as some personal information. Please follow the directions to create your username and password.     Your access code is: [email or web address]  Expires: 6/10/2018  9:17 AM     Your access code will  in 90 " "days. If you need help or a new code, please call your [place] clinic or [phone].        Care EveryWhere ID     This is your Care EveryWhere ID. This could be used by other organizations to access your [place] medical records  [id number]        Your Vitals Were     Pulse Temperature Height Pulse Oximetry BMI (Body Mass Index)       70 97.6  F (36.4  C) (Oral) 6' 0.44\" (1.84 m) 96% 28.96 kg/m2        Blood Pressure from Last 3 Encounters:   03/12/18 121/73   07/20/17 122/83   05/30/17 130/78    Weight from Last 3 Encounters:   03/12/18 216 lb 2 oz (98 kg)   07/20/17 213 lb 6 oz (96.8 kg)   05/30/17 216 lb (98 kg)              Today, you had the following     No orders found for display         Where to get your medicines      Some of these will need a paper prescription and others can be bought over the counter.  Ask your nurse if you have questions.     Bring a paper prescription for each of these medications     amoxicillin 500 MG capsule          Primary Care Provider Office Phone # Fax #    Madhu José [phone]       [address] [facility] [address]        Equal Access to Services     [facility] : Hadii aad ku hadasho Soomaali, waaxda luqadaha, qaybta kaalmada adeegyada, sivakumar herman. So [facility] [phone].    ATENCIÓN: Si habla español, tiene a betancourt disposición servicios gratuitos de asistencia lingüística. Llame al [phone].    We comply with applicable federal civil rights laws and [place] laws. We do not discriminate on the basis of race, color, national origin, age, disability, sex, sexual orientation, or gender identity.            Thank you!     Thank you for choosing [facility]  for your care. Our goal is always to provide you with excellent care. Hearing back from our patients is one way we can continue to improve our services. Please take a few minutes to complete the written survey that you may " receive in the mail after your visit with us. Thank you!             Your Updated Medication List - Protect others around you: Learn how to safely use, store and throw away your medicines at www.disposemymeds.org.          This list is accurate as of 3/12/18  9:18 AM.  Always use your most recent med list.                   Brand Name Dispense Instructions for use Diagnosis    amoxicillin 500 MG capsule    AMOXIL    30 capsule    Take 1 capsule (500 mg) by mouth 3 times daily    Acute sinusitis with symptoms > 10 days       aspirin 81 MG tablet     30 tablet    Take 1 tablet (81 mg) by mouth daily    Health care maintenance

## 2018-03-12 NOTE — PROGRESS NOTES
SUBJECTIVE:   Renan Rossi is a 68 year old male who presents to clinic today for the following health issues:       Still having a scratchy throat    none    Problem list and histories reviewed & adjusted, as indicated.  Additional history: as documented         Reviewed and updated as needed this visit by clinical staff       Reviewed and updated as needed this visit by Provider          throat better    Equilibrium off a little    Had phlegm in chest     Still there some    Cough was worse laying down    10 days     No vomiting / diarrhea    grandkids     Still working part time    Able to work    No fever    No meds used        Physical Exam   Constitutional: He is oriented to person, place, and time and well-developed, well-nourished, and in no distress. No distress.   HENT:   Head: Normocephalic and atraumatic.   Right Ear: Tympanic membrane, external ear and ear canal normal.   Left Ear: Tympanic membrane, external ear and ear canal normal.   Slightly red throat, mild amount of drainage.  No frontal / maxillary sinus tenderness.   Eyes: Conjunctivae are normal.   Neck: Neck supple. Carotid bruit is not present.   Cardiovascular: Normal rate, regular rhythm, normal heart sounds and intact distal pulses.  Exam reveals no gallop and no friction rub.    No murmur heard.  Pulmonary/Chest: Effort normal and breath sounds normal. No respiratory distress. He has no wheezes. He has no rales. He exhibits no tenderness.   Abdominal: Soft. There is no tenderness.   No back or costovertebral angle tenderness       Musculoskeletal: He exhibits no edema.   Lymphadenopathy:     He has no cervical adenopathy.   Neurological: He is alert and oriented to person, place, and time.   Skin: He is not diaphoretic.   Psychiatric: Mood and affect normal.       ASSESSMENT / PLAN:  (J01.90) Acute sinusitis with symptoms > 10 days  (primary encounter diagnosis)  Comment: overall patient is improving.  Likely viral but gave patient  prescription to use if symptoms worsen again.  Plan: amoxicillin (AMOXIL) 500 MG capsule        Could use over the counter mucinex.  Stay well hydrated.    Follow up prn symptoms.       I reviewed the patient's medications, allergies, medical history, family history, and social history.    Madhu José MD

## 2018-03-12 NOTE — PATIENT INSTRUCTIONS
Stay well hydrated    mucinex as needed    Hold prescription for antibiotic; fill if symptoms worsen    Stay well hydrated

## 2018-07-10 ENCOUNTER — OFFICE VISIT (OUTPATIENT)
Dept: FAMILY MEDICINE | Facility: CLINIC | Age: 68
End: 2018-07-10
Payer: MEDICARE

## 2018-07-10 VITALS
HEART RATE: 85 BPM | WEIGHT: 216 LBS | OXYGEN SATURATION: 95 % | DIASTOLIC BLOOD PRESSURE: 73 MMHG | BODY MASS INDEX: 28.94 KG/M2 | SYSTOLIC BLOOD PRESSURE: 115 MMHG | TEMPERATURE: 98.1 F

## 2018-07-10 DIAGNOSIS — T63.464A WASP STING, UNDETERMINED INTENT, INITIAL ENCOUNTER: Primary | ICD-10-CM

## 2018-07-10 PROCEDURE — 99213 OFFICE O/P EST LOW 20 MIN: CPT | Performed by: NURSE PRACTITIONER

## 2018-07-10 RX ORDER — TRIAMCINOLONE ACETONIDE 1 MG/G
CREAM TOPICAL
Qty: 30 G | Refills: 0 | Status: SHIPPED | OUTPATIENT
Start: 2018-07-10 | End: 2019-07-15

## 2018-07-10 ASSESSMENT — PAIN SCALES - GENERAL: PAINLEVEL: NO PAIN (0)

## 2018-07-10 NOTE — PROGRESS NOTES
SUBJECTIVE:   Renan Rossi is a 68 year old male who presents to clinic today for the following health issues:      Patient is here today with the concern for a wasp sting, was stung last Friday, still having redness, and itching.    Stung by wasp 4 days ago  Pain has resolved  Not very itchy  Area of redness not improving  Denies fever, chills, nausea, vomiting        Problem list and histories reviewed & adjusted, as indicated.  Additional history: none    Patient Active Problem List   Diagnosis     Advanced directives, counseling/discussion     Past Surgical History:   Procedure Laterality Date     C NONSPECIFIC PROCEDURE  1975    right kidney, blockage     COLONOSCOPY  11-21-13    Return for Colonoscopy In 1 Year     COLONOSCOPY  1-7-15    Return for Colonoscopy in 10 yrs       Social History   Substance Use Topics     Smoking status: Former Smoker     Types: Cigarettes     Quit date: 7/15/2012     Smokeless tobacco: Never Used     Alcohol use No     Family History   Problem Relation Age of Onset     Cancer - colorectal Mother            Reviewed and updated as needed this visit by clinical staff  Tobacco  Allergies  Meds  Med Hx  Surg Hx  Fam Hx  Soc Hx      Reviewed and updated as needed this visit by Provider         ROS:  Constitutional, HEENT, cardiovascular, pulmonary, gi and gu systems are negative, except as otherwise noted.    OBJECTIVE:     /73 (BP Location: Right arm, Patient Position: Chair, Cuff Size: Adult Regular)  Pulse 85  Temp 98.1  F (36.7  C) (Oral)  Wt 216 lb (98 kg)  SpO2 95%  BMI 28.94 kg/m2  Body mass index is 28.94 kg/(m^2).  GENERAL: healthy, alert and no distress  RESP: lungs clear to auscultation - no rales, rhonchi or wheezes  CV: regular rate and rhythm, normal S1 S2, no S3 or S4, no murmur, click or rub, no peripheral edema and peripheral pulses strong  MS: no gross musculoskeletal defects noted, no edema  SKIN: Pinpoint erythematous papule left medial forearm  with approximately 2-3 inch area of surrounding erythema without warmth or swelling. Skin intact    Diagnostic Test Results:  none     ASSESSMENT/PLAN:       ICD-10-CM    1. Wasp sting, undetermined intent, initial encounter T63.464A triamcinolone (KENALOG) 0.1 % cream     Discussed normal inflammatory response. No signs of infection. Will treat symptomatically with steroid cream and anti-histamine. Can try cool compress, ice and elevation as needed    Patient Instructions   Apply a thin layer of kenalog cream over redness three times daily until it resolves    You can take an anti-histamine daily until symptoms resolve. I recommend cetirizine (Zyrtec) or loratadine (Claritin) which are non-drowsy      CLARISA [facility]

## 2018-07-10 NOTE — PATIENT INSTRUCTIONS
Apply a thin layer of kenalog cream over redness three times daily until it resolves    You can take an anti-histamine daily until symptoms resolve. I recommend cetirizine (Zyrtec) or loratadine (Claritin) which are non-drowsy

## 2018-07-10 NOTE — MR AVS SNAPSHOT
After Visit Summary   7/10/2018    eRnan Rossi    MRN: [id number]           Patient Information     Date Of Birth          1950        Visit Information        Provider Department      7/10/2018 3:20 PM Shameka Guthrie APRN CNP [facility]        Today's Diagnoses     Wasp sting, undetermined intent, initial encounter    -  1      Care Instructions    Apply a thin layer of kenalog cream over redness three times daily until it resolves    You can take an anti-histamine daily until symptoms resolve. I recommend cetirizine (Zyrtec) or loratadine (Claritin) which are non-drowsy          Follow-ups after your visit        Who to contact     If you have questions or need follow up information about today's clinic visit or your schedule please contact [facility] directly at [phone].  Normal or non-critical lab and imaging results will be communicated to you by MyChart, letter or phone within 4 business days after the clinic has received the results. If you do not hear from us within 7 days, please contact the clinic through MyChart or phone. If you have a critical or abnormal lab result, we will notify you by phone as soon as possible.  Submit refill requests through Groupspeak or call your pharmacy and they will forward the refill request to us. Please allow 3 business days for your refill to be completed.          Additional Information About Your Visit        Care EveryWhere ID     This is your Care EveryWhere ID. This could be used by other organizations to access your [facility] medical records  [id number]        Your Vitals Were     Pulse Temperature Pulse Oximetry BMI (Body Mass Index)          85 98.1  F (36.7  C) (Oral) 95% 28.94 kg/m2         Blood Pressure from Last 3 Encounters:   07/10/18 115/73   03/12/18 121/73   07/20/17 122/83    Weight from Last 3 Encounters:   07/10/18 216 lb (98 kg)   03/12/18 216 lb 2 oz (98 kg)   07/20/17 213 lb  6 oz (96.8 kg)              Today, you had the following     No orders found for display         Today's Medication Changes          These changes are accurate as of 7/10/18  3:36 PM.  If you have any questions, ask your nurse or doctor.               Start taking these medicines.        Dose/Directions    triamcinolone 0.1 % cream   Commonly known as:  KENALOG   Used for:  Wasp sting, undetermined intent, initial encounter   Started by:  Shameka Guthrie APRN CNP        Apply sparingly to arm three times daily for 14 days.   Quantity:  30 g   Refills:  0            Where to get your medicines      These medications were sent to [id number] - [place], [place] - [address] AT [facility] [address]  [address], [place] [address]     Phone:  [phone]     triamcinolone 0.1 % cream                Primary Care Provider Office Phone # Fax #    Madhu José [phone]       [address] [facility] [address]        Equal Access to Services     SCOTT GUADALUPE AH: Hadii aad ku hadasho Soomaali, waaxda luqadaha, qaybta kaalmada adeegyada, waxay idiin hayaatrini peterson . So [facility] [phone].    ATENCIÓN: Si habla español, tiene a betancourt disposición servicios gratuitos de asistencia lingüística. Llame al [phone].    We comply with applicable federal civil rights laws and [place] laws. We do not discriminate on the basis of race, color, national origin, age, disability, sex, sexual orientation, or gender identity.            Thank you!     Thank you for choosing [facility]  for your care. Our goal is always to provide you with excellent care. Hearing back from our patients is one way we can continue to improve our services. Please take a few minutes to complete the written survey that you may receive in the mail after your visit with us. Thank you!             Your Updated Medication List - Protect  others around you: Learn how to safely use, store and throw away your medicines at www.disposemymeds.org.          This list is accurate as of 7/10/18  3:36 PM.  Always use your most recent med list.                   Brand Name Dispense Instructions for use Diagnosis    aspirin 81 MG tablet     30 tablet    Take 1 tablet (81 mg) by mouth daily    Health care maintenance       triamcinolone 0.1 % cream    KENALOG    30 g    Apply sparingly to arm three times daily for 14 days.    Wasp sting, undetermined intent, initial encounter

## 2019-02-26 ENCOUNTER — OFFICE VISIT (OUTPATIENT)
Dept: FAMILY MEDICINE | Facility: CLINIC | Age: 69
End: 2019-02-26
Payer: MEDICARE

## 2019-02-26 VITALS
BODY MASS INDEX: 30.1 KG/M2 | DIASTOLIC BLOOD PRESSURE: 76 MMHG | SYSTOLIC BLOOD PRESSURE: 123 MMHG | HEIGHT: 72 IN | WEIGHT: 222.25 LBS | TEMPERATURE: 97.6 F | HEART RATE: 77 BPM

## 2019-02-26 DIAGNOSIS — Z12.11 SCREEN FOR COLON CANCER: ICD-10-CM

## 2019-02-26 DIAGNOSIS — R73.01 IMPAIRED FASTING GLUCOSE: ICD-10-CM

## 2019-02-26 DIAGNOSIS — Z12.5 SCREENING FOR PROSTATE CANCER: ICD-10-CM

## 2019-02-26 DIAGNOSIS — H61.23 BILATERAL IMPACTED CERUMEN: Primary | ICD-10-CM

## 2019-02-26 LAB
HBA1C MFR BLD: 6.1 % (ref 0–5.6)
PSA SERPL-ACNC: 4.74 UG/L (ref 0–4)

## 2019-02-26 PROCEDURE — G0103 PSA SCREENING: HCPCS | Performed by: FAMILY MEDICINE

## 2019-02-26 PROCEDURE — 99213 OFFICE O/P EST LOW 20 MIN: CPT | Mod: 25 | Performed by: FAMILY MEDICINE

## 2019-02-26 PROCEDURE — 36415 COLL VENOUS BLD VENIPUNCTURE: CPT | Performed by: FAMILY MEDICINE

## 2019-02-26 PROCEDURE — 69210 REMOVE IMPACTED EAR WAX UNI: CPT | Performed by: FAMILY MEDICINE

## 2019-02-26 PROCEDURE — 83036 HEMOGLOBIN GLYCOSYLATED A1C: CPT | Performed by: FAMILY MEDICINE

## 2019-02-26 ASSESSMENT — PAIN SCALES - GENERAL: PAINLEVEL: NO PAIN (0)

## 2019-02-26 ASSESSMENT — MIFFLIN-ST. JEOR: SCORE: 1818.12

## 2019-02-26 NOTE — LETTER
"[facility]   [address] [place]  [address]  [phone]                                   February 27, 2019    Renan Rossi  [address] [facility] [address]        Dear Renan,    Hemoglobin a1c is still in the \"prediabetes\" range.  No medications needed for this.  Just keep working on healthy diet/exercise.    The prostate blood test is better.    Results for orders placed or performed in visit on 02/26/19   Prostate spec antigen screen   Result Value Ref Range    PSA 4.74 (H) 0 - 4 ug/L   Hemoglobin A1c   Result Value Ref Range    Hemoglobin A1C 6.1 (H) 0 - 5.6 %       If you have any questions please call the clinic at [phone]    Sincerely,    Madhu José MD  hnr  "

## 2019-02-26 NOTE — PROGRESS NOTES
SUBJECTIVE:   Renan Rossi is a 69 year old male who presents to clinic today for the following health issues:       Bilateral ear was, hard time hearing out of right ear. Had 1 bout of dizziness last week    none    Problem list and histories reviewed & adjusted, as indicated.  Additional history: as documented         Reviewed and updated as needed this visit by clinical staff       Reviewed and updated as needed this visit by Provider          24 hours of dizziness last week    If moved head fast    Crackling in ears    Overall hearing okay    Full physical not done     Mentation and affect are fine    No tremor of speech or extremity    Throat okay    No sinus/ submandib tenderness    No tenderness when pushing on tragus or pulling on helix bilaterally    Large amount of cerumen present bilaterally.  Could not see tympanic membranes at all.    With consent used gentle irrigation and curette to remove all wax.  Good results.  Tympanic membranes and canals are fine.       No complications.    ASSESSMENT / PLAN:  (H61.23) Bilateral impacted cerumen  (primary encounter diagnosis)  Comment: removed here   Plan: REMOVE IMPACTED CERUMEN        Follow up prn     (R73.01) Impaired fasting glucose  Comment: prudent to recheck.  A couple year ago was in what is now considered prediabetes range    Plan: Hemoglobin A1c             (Z12.5) Screening for prostate cancer  Comment: history of elevated psa   Plan: Prostate spec antigen screen        Recheck     (Z12.11) Screen for colon cancer  Comment: we looked up his last two colonoscopy reports.   Plan: up to date       I reviewed the patient's medications, allergies, medical history, family history, and social history.    Madhu José MD

## 2019-02-27 NOTE — RESULT ENCOUNTER NOTE
"Hemoglobin a1c is still in the \"prediabetes\" range.  No medications needed for this.  Just keep working on healthy diet/exercise.    The prostate blood test is better.    Mahdu José MD  "

## 2019-07-15 ENCOUNTER — OFFICE VISIT (OUTPATIENT)
Dept: FAMILY MEDICINE | Facility: CLINIC | Age: 69
End: 2019-07-15
Payer: MEDICARE

## 2019-07-15 VITALS
WEIGHT: 218 LBS | BODY MASS INDEX: 29.21 KG/M2 | OXYGEN SATURATION: 98 % | DIASTOLIC BLOOD PRESSURE: 73 MMHG | HEART RATE: 74 BPM | SYSTOLIC BLOOD PRESSURE: 113 MMHG | TEMPERATURE: 97.4 F

## 2019-07-15 DIAGNOSIS — R22.1 LUMP IN NECK: Primary | ICD-10-CM

## 2019-07-15 DIAGNOSIS — D11.9 WARTHIN TUMOR: ICD-10-CM

## 2019-07-15 PROCEDURE — 99213 OFFICE O/P EST LOW 20 MIN: CPT | Performed by: FAMILY MEDICINE

## 2019-07-15 ASSESSMENT — PAIN SCALES - GENERAL: PAINLEVEL: NO PAIN (0)

## 2019-07-15 NOTE — PATIENT INSTRUCTIONS
Call to schedule ENT appointment at [facility]    Dr. Weller if available    Call with problems/ questions

## 2019-07-15 NOTE — PROGRESS NOTES
Subjective     Renan Rossi is a 69 year old male who presents to clinic today for the following health issues:    HPI   Check lump on right side of neck. He states it has been there a long time and does not seem to grow  none            Reviewed and updated as needed this visit by Provider         Review of Systems   Patient thinks it has been there a few years    Not getting bigger    Wife wanted him seen    Otherwise feeling good    No wt change    No cough/ chest pain / dyspnea         Objective    /73 (BP Location: Left arm, Patient Position: Chair, Cuff Size: Adult Regular)   Pulse 74   Temp 97.4  F (36.3  C) (Oral)   Wt 98.9 kg (218 lb)   SpO2 98%   BMI 29.21 kg/m    Body mass index is 29.21 kg/m .  Physical Exam   Full physical not done     Mentation and affect are fine    No tremor of speech or extremity    Heart and lungs fine    No bruits    Patient has soft moveable lump under right ear near corner of mandible  Not tender but signifcant    Definite assymetry between right and left    Of note we did review past op note and pathology from Dr. 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    Madhu José MD

## 2019-11-19 ENCOUNTER — TELEPHONE (OUTPATIENT)
Dept: OTOLARYNGOLOGY | Facility: CLINIC | Age: 69
End: 2019-11-19

## 2019-11-19 NOTE — TELEPHONE ENCOUNTER
[facility] Call Center    Phone Message    May a detailed message be left on voicemail: yes    Reason for Call: Other: Please contact Pt regarding Epic referral for consult     Action Taken: Message routed to:  [facility] ENT

## 2019-11-20 NOTE — TELEPHONE ENCOUNTER
Called number and was unavailable. Per ENT nurse: This pt can be scheduled with next available head and neck (Dr. Goodwin, Dr. Rowland, Dr. Chase, Dr. Ramesh). He will need a CT prior to his appointment.

## 2019-11-25 ENCOUNTER — TELEPHONE (OUTPATIENT)
Dept: OTOLARYNGOLOGY | Facility: CLINIC | Age: 69
End: 2019-11-25

## 2019-11-25 DIAGNOSIS — R22.1 NECK MASS: Primary | ICD-10-CM

## 2019-11-25 NOTE — TELEPHONE ENCOUNTER
ONCOLOGY INTAKE: Records Information      APPT INFORMATION:  Referring provider: Dr. Madhu José MD  Referring provider s clinic:  [facility]  Reason for visit/diagnosis:  Lump in neck  Has patient been notified of appointment date and time?: No (awaiting sooner appt)    RECORDS INFORMATION:  Were the records received with the referral (via Rightfax)? No    Has patient been seen for any external appt for this diagnosis? No    If yes, where? N/a    Has patient had any imaging or procedures outside of [facility] for this condition? No      If Yes, where? N/a    ADDITIONAL INFORMATION:  None

## 2019-11-25 NOTE — TELEPHONE ENCOUNTER
FUTURE VISIT INFORMATION      FUTURE VISIT INFORMATION:    Date: 12/10/19    Time: 9:30 AM    Location: [facility]  REFERRAL INFORMATION:    Referring provider:  Dr. José    Referring providers clinic:  [facility]    Reason for visit/diagnosis:  Lump in Neck and Warthin Tumor    RECORDS REQUESTED FROM:       Clinic name Comments Records Status Imaging Status   [facility] 7/15/19 - OV with Dr. José  8/18/15 - OV with Dr. Estefanía Enamorado    [facility] - ENT 5/5/08 - OV with Dr. Weller  11/20/07 - OP Note LEFT-SIDED SUBTOTAL PAROTIDECTOMY WITH FACIAL NERVE MONITORING with Dr. Janee Enamorado    [facility] - Imaging ([facility]) 11/29/19 - CT Soft Tissue Neck W  9/27/07 - CT Soft Tissue Neck W PACs PACs

## 2019-11-25 NOTE — TELEPHONE ENCOUNTER
[facility] Call Center    Phone Message    May a detailed message be left on voicemail: yes    Reason for Call: Other: Pt has appt set for 12/10. Seeking appt within next 5 days. Appt scheduled with 1st avail provider per 11/20/2019 encounter msg. Pt will also need CT order. Pt aware of ph# to schedule CT when order is placed     Action Taken: Message routed to:  [facility] ent

## 2019-11-29 ENCOUNTER — ANCILLARY PROCEDURE (OUTPATIENT)
Dept: CT IMAGING | Facility: CLINIC | Age: 69
End: 2019-11-29
Attending: OTOLARYNGOLOGY
Payer: MEDICARE

## 2019-11-29 DIAGNOSIS — R22.1 NECK MASS: ICD-10-CM

## 2019-11-29 RX ORDER — IOPAMIDOL 755 MG/ML
100 INJECTION, SOLUTION INTRAVASCULAR ONCE
Status: COMPLETED | OUTPATIENT
Start: 2019-11-29 | End: 2019-11-29

## 2019-11-29 RX ADMIN — IOPAMIDOL 100 ML: 755 INJECTION, SOLUTION INTRAVASCULAR at 10:17

## 2019-12-03 ENCOUNTER — OFFICE VISIT (OUTPATIENT)
Dept: OTOLARYNGOLOGY | Facility: CLINIC | Age: 69
End: 2019-12-03
Attending: FAMILY MEDICINE
Payer: MEDICARE

## 2019-12-03 ENCOUNTER — PREP FOR PROCEDURE (OUTPATIENT)
Dept: OTOLARYNGOLOGY | Facility: CLINIC | Age: 69
End: 2019-12-03

## 2019-12-03 VITALS
DIASTOLIC BLOOD PRESSURE: 83 MMHG | HEART RATE: 85 BPM | SYSTOLIC BLOOD PRESSURE: 129 MMHG | BODY MASS INDEX: 29.85 KG/M2 | WEIGHT: 222.8 LBS | OXYGEN SATURATION: 95 %

## 2019-12-03 DIAGNOSIS — R22.1 NECK MASS: Primary | ICD-10-CM

## 2019-12-03 DIAGNOSIS — K11.8 PAROTID MASS: Primary | ICD-10-CM

## 2019-12-03 PROCEDURE — 00000155 ZZHCL STATISTIC H-CELL BLOCK W/STAIN: Performed by: OTOLARYNGOLOGY

## 2019-12-03 PROCEDURE — 88173 CYTOPATH EVAL FNA REPORT: CPT | Performed by: OTOLARYNGOLOGY

## 2019-12-03 PROCEDURE — 88305 TISSUE EXAM BY PATHOLOGIST: CPT | Performed by: OTOLARYNGOLOGY

## 2019-12-03 PROCEDURE — 88172 CYTP DX EVAL FNA 1ST EA SITE: CPT | Performed by: OTOLARYNGOLOGY

## 2019-12-03 ASSESSMENT — PAIN SCALES - GENERAL: PAINLEVEL: NO PAIN (0)

## 2019-12-03 NOTE — PATIENT INSTRUCTIONS
1. You were seen in the ENT Clinic today by Dr. Ramesh  If you have any questions or concerns after your appointment, please call   - Option 1: ENT Clinic: [phone]  - Option 2: Melanie DOBSON Nurse Coordinator: [phone]    2. You will be called with the results of your FNA in 5-7 business days.     3. Our surgery scheduler will be calling you with a surgery date/time in the next week.     Thank you for allowing us to be apart of your care!  Magda Van LPN

## 2019-12-03 NOTE — PROGRESS NOTES
Relevant Diagnosis: parotid mass  Teaching Topic: Extracapsular parotidectomy   Person(s) involved in teaching: Patient     Teaching Concerns Addressed:  Pre op teaching included the need for an H&P, NPO status pre op, hospital routines, expected recovery, activity  restrictions, antimicrobial scrub, s/s of infection, pain control methods and the importance of follow up appointments.  The patient voiced an understanding of all instructions and will call with questions.     Motivation Level: motivated  Asks Questions:   Yes  Eager to Learn:   Yes  Cooperative:   Yes  Receptive (willing/able to accept information):   Yes     Patient  demonstrates understanding of the following: drain teaching, pre-op teaching  Reason for the appointment, diagnosis and treatment plan:   Yes  Knowledge of proper use of medications and conditions for which they are ordered (with special attention to potential side effects or drug interactions):   Yes  Which situations necessitate calling provider and whom to contact:   Yes        Proper use and care of  (medical equip, care aids, etc.):   NA  Nutritional needs and diet plan:   Yes  Pain management techniques:   Yes  Patient instructed on hand hygiene:  Yes  How and/when to access community resources:   NA     Infection Prevention:  Patient   demonstrates understanding of the following:   Surgical procedure site care taught   Signs and symptoms of infection taught Yes  Wound care taught Yes     Instructional Materials Used/Given: Pre op booklet.      Natice Schwab, RN BSN

## 2019-12-03 NOTE — LETTER
12/3/2019       RE: Renan Rossi  [address] [place] [address]     Dear Colleague,    Thank you for referring your patient, Renan Rossi, to the [facility] at [facility]. Please see a copy of my visit note below.      Otolaryngology Clinic      Name: Renan Rossi  MRN: [id number]  Age: 69 year old  : 1950  Referring provider: Madhu José  2019      Chief Complaint:  Mass     History of Present Illness:   Renan Rossi is a 69 year old male with a history of neck mass and Warthin tumor who presents for evaluation. Patient had a CT neck on 2019 which is as outlined below. Recommended ultrasound and possible biopsy of an enlarged right parotid gland. The patient states that he has had this mass for the last few years and that it is asymptomatic. However, he would like to have this removed either way. He reports that he had a similar mass resected on the left side previously.      Active Medications:     Current Outpatient Medications:      aspirin 81 MG tablet, Take 1 tablet (81 mg) by mouth daily, Disp: 30 tablet, Rfl:       Allergies:   Patient has no known allergies.      Past Medical History:  Tobacco abuse     Past Surgical History:  No past surgical history.    Family History:   Colorectal cancer: Mother      Social History:   The patient is a former smoker.  Denies alcohol use.  Denies drug use.    Review of Systems:   Pertinent items are noted in HPI or as in patient entered ROS below, remainder of complete ROS is negative.     Physical Exam:   /83 (BP Location: Left arm, Patient Position: Sitting, Cuff Size: Adult Regular)   Pulse 85   Wt 101.1 kg (222 lb 12.8 oz)   SpO2 95%   BMI 29.85 kg/m        Constitutional:  The patient was unaccompanied, well-groomed, and in no acute distress.    Skin:  Warm and pink.    Neurologic:  Alert and oriented x 3.  CN's III-XII within normal limits.  Voice normal.    Psychiatric:  The patient's affect was calm, cooperative, and appropriate.    Respiratory:  Breathing comfortably without stridor or exertion of accessory muscles.    Eyes: Extraocular movement intact.    Head:  Normocephalic and atraumatic.  No lesions or scars.    Ears:  Pinnae and tragus non-tender.  Wax removed from both EAC's, TM's were clear.     Nose:  Sinuses were non-tender.  Anterior rhinoscopy revealed midline septum and absence of purulence or polyps.    OC/OP:  Normal tongue, floor of mouth, buccal mucosa, and palate.  No lesions or masses on inspection or palpation.  No abnormal lymph tissue in the oropharynx.  The pterygoid region is non-tender.    Neck:  Supple with normal laryngeal and tracheal landmarks.  The parotid beds were without masses.  No palpable thyroid.  Lymphatic:  There is no palpable lymphadenopathy in the neck.     CT soft tissue neck (11/29/2019):  Impression:  1. Heterogeneous and enlarged right parotid gland, visualization of  which is degraded by artifact. Correlation with imaging examinations  more recent than 2007 would be helpful, if available. There is  significant suspicion for an underlying mass lesion. Recommend  correlation with dedicated ultrasound. This could also be helpful for  potential biopsy/FNA planning.  2. Small ovoid nodule along the lateral aspect of the left  sternocleidomastoid muscle. Differential considerations include  chronic postsurgical change versus less likely early tumor recurrence.  As above, correlation with imaging examinations more recent than 2007  would be helpful, if available.     Assessment and Plan:  There are no diagnoses linked to this encounter.   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     Follow-up: No follow-ups on file.         Scribe Disclosure:  Juancarlos NGUYỄN, am serving as a scribe to document  services personally performed by Doron Ramesh MD at this visit, based upon the provider's statements to me. All documentation has been reviewed by the aforementioned provider prior to being entered into the official medical record.       Relevant Diagnosis: parotid mass  Teaching Topic: Extracapsular parotidectomy   Person(s) involved in teaching: Patient     Teaching Concerns Addressed:  Pre op teaching included the need for an H&P, NPO status pre op, hospital routines, expected recovery, activity  restrictions, antimicrobial scrub, s/s of infection, pain control methods and the importance of follow up appointments.  The patient voiced an understanding of all instructions and will call with questions.     Motivation Level: motivated  Asks Questions:   Yes  Eager to Learn:   Yes  Cooperative:   Yes  Receptive (willing/able to accept information):   Yes     Patient  demonstrates understanding of the following: drain teaching, pre-op teaching  Reason for the appointment, diagnosis and treatment plan:   Yes  Knowledge of proper use of medications and conditions for which they are ordered (with special attention to potential side effects or drug interactions):   Yes  Which situations necessitate calling provider and whom to contact:   Yes        Proper use and care of  (medical equip, care aids, etc.):   NA  Nutritional needs and diet plan:   Yes  Pain management techniques:   Yes  Patient instructed on hand hygiene:  Yes  How and/when to access community resources:   NA     Infection Prevention:  Patient   demonstrates understanding of the following:   Surgical procedure site care taught   Signs and symptoms of infection taught Yes  Wound care taught Yes     Instructional Materials Used/Given: Pre op booklet.      Natice Schwab, RN BSN      Again, thank you for allowing me to participate in the care of your patient.      Sincerely,    Doron Ramesh MD

## 2019-12-03 NOTE — PROGRESS NOTES
Otolaryngology Clinic      Name: Renan Rossi  MRN: [id number]  Age: 69 year old  : 1950  Referring provider: Madhu José  2019      Chief Complaint:  Mass     History of Present Illness:   Renan Rossi is a 69 year old male with a history of neck mass and Warthin tumor who presents for evaluation. Patient had a CT neck on 2019 which is as outlined below. Recommended ultrasound and possible biopsy of an enlarged right parotid gland. The patient states that he has had this mass for the last few years and that it is asymptomatic. However, he would like to have this removed either way. He reports that he had a similar mass resected on the left side previously.      Active Medications:     Current Outpatient Medications:      aspirin 81 MG tablet, Take 1 tablet (81 mg) by mouth daily, Disp: 30 tablet, Rfl:       Allergies:   Patient has no known allergies.      Past Medical History:  Tobacco abuse     Past Surgical History:  No past surgical history.    Family History:   Colorectal cancer: Mother      Social History:   The patient is a former smoker.  Denies alcohol use.  Denies drug use.    Review of Systems:   Pertinent items are noted in HPI or as in patient entered ROS below, remainder of complete ROS is negative.     Physical Exam:   /83 (BP Location: Left arm, Patient Position: Sitting, Cuff Size: Adult Regular)   Pulse 85   Wt 101.1 kg (222 lb 12.8 oz)   SpO2 95%   BMI 29.85 kg/m       Constitutional:  The patient was unaccompanied, well-groomed, and in no acute distress.    Skin:  Warm and pink.    Neurologic:  Alert and oriented x 3.  CN's III-XII within normal limits.  Voice normal.   Psychiatric:  The patient's affect was calm, cooperative, and appropriate.    Respiratory:  Breathing comfortably without stridor or exertion of accessory muscles.    Eyes: Extraocular movement intact.    Head:  Normocephalic and atraumatic.  No lesions or scars.    Ears:  Pinnae and tragus  non-tender.  Wax removed from both EAC's, TM's were clear.     Nose:  Sinuses were non-tender.  Anterior rhinoscopy revealed midline septum and absence of purulence or polyps.    OC/OP:  Normal tongue, floor of mouth, buccal mucosa, and palate.  No lesions or masses on inspection or palpation.  No abnormal lymph tissue in the oropharynx.  The pterygoid region is non-tender.    Neck:  Supple with normal laryngeal and tracheal landmarks.  The parotid beds were without masses.  No palpable thyroid.  Lymphatic:  There is no palpable lymphadenopathy in the neck.     CT soft tissue neck (11/29/2019):  Impression:  1. Heterogeneous and enlarged right parotid gland, visualization of  which is degraded by artifact. Correlation with imaging examinations  more recent than 2007 would be helpful, if available. There is  significant suspicion for an underlying mass lesion. Recommend  correlation with dedicated ultrasound. This could also be helpful for  potential biopsy/FNA planning.  2. Small ovoid nodule along the lateral aspect of the left  sternocleidomastoid muscle. Differential considerations include  chronic postsurgical change versus less likely early tumor recurrence.  As above, correlation with imaging examinations more recent than 2007  would be helpful, if available.     Assessment and Plan:  There are no diagnoses linked to this encounter.   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     Follow-up: No follow-ups on file.         Scribe Disclosure:  Juancarlos NGUYỄN, am serving as a scribe to document services personally performed by Doron Ramesh MD at this visit, based upon the provider's statements to me. All documentation has been reviewed by the aforementioned provider prior to being entered into the official medical record.

## 2019-12-04 ENCOUNTER — TELEPHONE (OUTPATIENT)
Dept: OTOLARYNGOLOGY | Facility: CLINIC | Age: 69
End: 2019-12-04

## 2019-12-04 PROBLEM — K11.8 PAROTID MASS: Status: ACTIVE | Noted: 2019-12-04

## 2019-12-04 NOTE — TELEPHONE ENCOUNTER
Patient is scheduled for surgery with Dr. Ramesh    Spoke or left message with: Spoke with patient     Date of Surgery: 12/16/2019    Location: [facility] OR     H&P: will call to schedule his PCP clinic to schedule     Additional imaging/appointments: post op 1 week.     Surgery packet: Given in clinic with Soap by BRONSON Navarro      Prior Authorization: sent to [facility]     Additional comments:   Understands times may change and will receive phone call from nurse within 2-3 days prior to surgery. Patient understands.         Sunni Monsalve  Perioperative Coordinator, ENT  [phone]

## 2019-12-05 LAB — COPATH REPORT: NORMAL

## 2019-12-09 ENCOUNTER — OFFICE VISIT (OUTPATIENT)
Dept: FAMILY MEDICINE | Facility: CLINIC | Age: 69
End: 2019-12-09
Payer: MEDICARE

## 2019-12-09 VITALS
TEMPERATURE: 97.9 F | BODY MASS INDEX: 30.06 KG/M2 | DIASTOLIC BLOOD PRESSURE: 82 MMHG | WEIGHT: 224.38 LBS | HEART RATE: 73 BPM | SYSTOLIC BLOOD PRESSURE: 124 MMHG

## 2019-12-09 DIAGNOSIS — Z01.818 PREOP GENERAL PHYSICAL EXAM: Primary | ICD-10-CM

## 2019-12-09 DIAGNOSIS — D11.9 WARTHIN'S TUMOR: ICD-10-CM

## 2019-12-09 LAB
ANION GAP SERPL CALCULATED.3IONS-SCNC: 8 MMOL/L (ref 3–14)
BASOPHILS # BLD AUTO: 0 10E9/L (ref 0–0.2)
BASOPHILS NFR BLD AUTO: 0.5 %
BUN SERPL-MCNC: 18 MG/DL (ref 7–30)
CALCIUM SERPL-MCNC: 9.2 MG/DL (ref 8.5–10.1)
CHLORIDE SERPL-SCNC: 108 MMOL/L (ref 94–109)
CO2 SERPL-SCNC: 24 MMOL/L (ref 20–32)
CREAT SERPL-MCNC: 1.12 MG/DL (ref 0.66–1.25)
DIFFERENTIAL METHOD BLD: NORMAL
EOSINOPHIL # BLD AUTO: 0.2 10E9/L (ref 0–0.7)
EOSINOPHIL NFR BLD AUTO: 2.3 %
ERYTHROCYTE [DISTWIDTH] IN BLOOD BY AUTOMATED COUNT: 13.9 % (ref 10–15)
GFR SERPL CREATININE-BSD FRML MDRD: 66 ML/MIN/{1.73_M2}
GLUCOSE SERPL-MCNC: 106 MG/DL (ref 70–99)
HCT VFR BLD AUTO: 45.5 % (ref 40–53)
HGB BLD-MCNC: 15.2 G/DL (ref 13.3–17.7)
LYMPHOCYTES # BLD AUTO: 2 10E9/L (ref 0.8–5.3)
LYMPHOCYTES NFR BLD AUTO: 30.6 %
MCH RBC QN AUTO: 29 PG (ref 26.5–33)
MCHC RBC AUTO-ENTMCNC: 33.4 G/DL (ref 31.5–36.5)
MCV RBC AUTO: 87 FL (ref 78–100)
MONOCYTES # BLD AUTO: 0.5 10E9/L (ref 0–1.3)
MONOCYTES NFR BLD AUTO: 7.5 %
NEUTROPHILS # BLD AUTO: 3.9 10E9/L (ref 1.6–8.3)
NEUTROPHILS NFR BLD AUTO: 59.1 %
PLATELET # BLD AUTO: 242 10E9/L (ref 150–450)
POTASSIUM SERPL-SCNC: 4.2 MMOL/L (ref 3.4–5.3)
RBC # BLD AUTO: 5.24 10E12/L (ref 4.4–5.9)
SODIUM SERPL-SCNC: 140 MMOL/L (ref 133–144)
WBC # BLD AUTO: 6.6 10E9/L (ref 4–11)

## 2019-12-09 PROCEDURE — 99215 OFFICE O/P EST HI 40 MIN: CPT | Performed by: FAMILY MEDICINE

## 2019-12-09 PROCEDURE — 93010 ELECTROCARDIOGRAM REPORT: CPT | Performed by: INTERNAL MEDICINE

## 2019-12-09 PROCEDURE — 85025 COMPLETE CBC W/AUTO DIFF WBC: CPT | Performed by: FAMILY MEDICINE

## 2019-12-09 PROCEDURE — 80048 BASIC METABOLIC PNL TOTAL CA: CPT | Performed by: FAMILY MEDICINE

## 2019-12-09 PROCEDURE — 93005 ELECTROCARDIOGRAM TRACING: CPT | Performed by: FAMILY MEDICINE

## 2019-12-09 PROCEDURE — 36415 COLL VENOUS BLD VENIPUNCTURE: CPT | Performed by: FAMILY MEDICINE

## 2019-12-09 ASSESSMENT — PAIN SCALES - GENERAL: PAINLEVEL: NO PAIN (0)

## 2019-12-09 NOTE — PATIENT INSTRUCTIONS
Before Your Surgery      Call your surgeon if there is any change in your health. This includes signs of a cold or flu (such as a sore throat, runny nose, cough, rash or fever).    Do not smoke, drink alcohol or take over the counter medicine (unless your surgeon or primary care doctor tells you to) for the 24 hours before and after surgery.    If you take prescribed drugs: Follow your doctor s orders about which medicines to take and which to stop until after surgery.    Eating and drinking prior to surgery: follow the instructions from your surgeon    Take a shower or bath the night before surgery. Use the soap your surgeon gave you to gently clean your skin. If you do not have soap from your surgeon, use your regular soap. Do not shave or scrub the surgery site.  Wear clean pajamas and have clean sheets on your bed.           Hold aspirin for one week prior to procedure    No ibuprofen or naproxen either    Tylenol okay    We will send you lab results

## 2019-12-09 NOTE — PROGRESS NOTES
[address] [id number] [phone]  Dept: [phone]    PRE-OP EVALUATION:  Today's date: 2019    Renan Rossi (: 1950) presents for pre-operative evaluation assessment as requested by Dr. Samano.  He requires evaluation and anesthesia risk assessment prior to undergoing surgery/procedure for treatment of tumor on neck .    Fax number for surgical facility: [phone]  Primary Physician: Madhu José  Type of Anesthesia Anticipated: General    Patient has a Health Care Directive or Living Will:  YES     Preop Questions 2019   Who is doing your surgery? dr sherri samano   What are you having done? tumor on neck   Date of Surgery/Procedure: 2019   Facility or Hospital where procedure/surgery will be performed: [facility]   1.  Do you have a history of Heart attack, stroke, stent, coronary bypass surgery, or other heart surgery? No   2.  Do you ever have any pain or discomfort in your chest? No   3.  Do you have a history of  Heart Failure? No   4.   Are you troubled by shortness of breath when:  walking on a level surface, or up a slight hill, or at night? No   5.  Do you currently have a cold, bronchitis or other respiratory infection? No   6.  Do you have a cough, shortness of breath, or wheezing? No   7.  Do you sometimes get pains in the calves of your legs when you walk? No   8. Do you or anyone in your family have previous history of blood clots? No   9.  Do you or does anyone in your family have a serious bleeding problem such as prolonged bleeding following surgeries or cuts? No   10. Have you ever had problems with anemia or been told to take iron pills? No   11. Have you had any abnormal blood loss such as black, tarry or bloody stools? No   12. Have you ever had a blood transfusion? No   13. Have you or any of your relatives ever had problems with anesthesia? No   14. Do you have sleep  apnea, excessive snoring or daytime drowsiness? No   15. Do you have any prosthetic heart valves? No   16. Do you have prosthetic joints? No         HPI:     HPI related to upcoming procedure: 69 year old male with mass on left side of neck.  To have excision.  Had needle biopsy done, Warthin tumor.           MEDICAL HISTORY:     Patient Active Problem List    Diagnosis Date Noted     Parotid mass 2019     Priority: Medium     Added automatically from request for surgery [id number]       Advanced directives, counseling/discussion 2012     Priority: Medium     Advance Care Planning:   ACP Review and Resources Provided:  Reviewed chart for advance care plan.  Renan Rossi has no plan or code status on file. Discussed available resources and provided with information. Confirmed code status reflects current choices pending further ACP discussions.  Confirmed/documented designated decision maker(s). See permanent comments section of demographics in clinical tab.   Added by Linsey Lr on 2013  Discussed advance care planning with patient; however, patient declined at this time. 2012         Past Medical History:   Diagnosis Date     Hypercholesteremia      Tobacco abuse      Past Surgical History:   Procedure Laterality Date     C NONSPECIFIC PROCEDURE  1975    right kidney, blockage     COLONOSCOPY  13    Return for Colonoscopy In 1 Year     COLONOSCOPY  1-7-15    Return for Colonoscopy in 10 yrs   had another Warthin Tumor excised       Current Outpatient Medications   Medication Sig Dispense Refill     aspirin 81 MG tablet Take 1 tablet (81 mg) by mouth daily 30 tablet      OTC products: None, except as noted above    No Known Allergies   Latex Allergy: NO    Social History     Tobacco Use     Smoking status: Former Smoker     Types: Cigarettes     Last attempt to quit: 7/15/2012     Years since quittin.4     Smokeless tobacco: Never Used   Substance Use Topics     Alcohol use: No      History   Drug Use No       REVIEW OF SYSTEMS:   Constitutional, neuro, ENT, endocrine, pulmonary, cardiac, gastrointestinal, genitourinary, musculoskeletal, integument and psychiatric systems are negative, except as otherwise noted.    No recent illnesses    EXAM:   /82 (BP Location: Right arm, Patient Position: Chair, Cuff Size: Adult Regular)   Pulse 73   Temp 97.9  F (36.6  C) (Oral)   Wt 101.8 kg (224 lb 6 oz)   BMI 30.06 kg/m      GENERAL APPEARANCE: healthy, alert and no distress     EYES: EOMI, PERRL     HENT: ear canals and TM's normal and nose and mouth without ulcers or lesions     NECK: no adenopathy, no asymmetry, masses, or scars and thyroid normal to palpation     RESP: lungs clear to auscultation - no rales, rhonchi or wheezes     CV: regular rates and rhythm, normal S1 S2, no S3 or S4 and no murmur, click or rub     ABDOMEN:  soft, nontender, no HSM or masses and bowel sounds normal     MS: extremities normal- no gross deformities noted, no evidence of inflammation in joints, FROM in all extremities.     SKIN: no suspicious lesions or rashes     NEURO: Normal strength and tone, sensory exam grossly normal, mentation intact and speech normal     PSYCH: mentation appears normal. and affect normal/bright     LYMPHATICS: No cervical adenopathy    DIAGNOSTICS:   See ekg done today, normal sinus    Labs drawn today, pending    Recent Labs   Lab Test 02/26/19  0738 01/25/17  0732   HGB  --  16.0   PLT  --  233   NA  --  138   POTASSIUM  --  4.5   CR  --  1.00   A1C 6.1* 5.9        IMPRESSION:   Reason for surgery/procedure: Warthin's tumor left side of neck  Diagnosis/reason for consult: pre-op clearance    The proposed surgical procedure is considered INTERMEDIATE risk.    REVISED CARDIAC RISK INDEX  The patient has the following serious cardiovascular risks for perioperative complications such as (MI, PE, VFib and 3  AV Block):  No serious cardiac risks  INTERPRETATION: 0 risks: Class I  "(very low risk - 0.4% complication rate)    The patient has the following additional risks for perioperative complications:  No identified additional risks      ICD-10-CM    1. Preop general physical exam Z01.818        RECOMMENDATIONS:          --Patient is to take all scheduled medications on the day of surgery EXCEPT for modifications listed below.    Patient to hold the aspirin for a week prior to procedure    Patient not on any other medication    APPROVAL GIVEN to proceed with proposed procedure, without further diagnostic evaluation    No cardiac history or symptoms     Patient has been in \"prediabetes\" range on the hemoglobin a1c, last one was 6.1 earlier this year    No history of anesthesia problems    No bleeding or clotting problems       Signed Electronically by: Madhu José MD    Copy of this evaluation report is provided to requesting physician.    Jj Preop Guidelines    Revised Cardiac Risk Index  "

## 2019-12-10 ENCOUNTER — TELEPHONE (OUTPATIENT)
Dept: OTOLARYNGOLOGY | Facility: CLINIC | Age: 69
End: 2019-12-10

## 2019-12-10 ENCOUNTER — PRE VISIT (OUTPATIENT)
Dept: OTOLARYNGOLOGY | Facility: CLINIC | Age: 69
End: 2019-12-10

## 2019-12-10 ENCOUNTER — PREP FOR PROCEDURE (OUTPATIENT)
Dept: OTOLARYNGOLOGY | Facility: CLINIC | Age: 69
End: 2019-12-10

## 2019-12-10 NOTE — TELEPHONE ENCOUNTER
Pt called with FNA results.     CYTOLOGIC INTERPRETATION:     A.  Parotid, right, palpation guided fine needle aspiration:   - Neoplasm: Benign   - Compatible with Warthin tumor   Specimen Adequacy: Satisfactory for evaluation.     B.  Neck, left node, palpation guided fine needle aspiration:   - Nondiagnostic specimen.   Specimen Adequacy: Unsatisfactory for evaluation.  Specimen processed and   evaluated, but unsatisfactory for   evaluation of epithelial cell abnormality due to:   -scant cellularity.     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 Plan for surgical resection 12/16. All pre-op education reviewed. Pt had pre-op physical last week.     Direct line given for additional questions/concerns.     Natice Schwab, RN BSN

## 2019-12-13 ENCOUNTER — ANESTHESIA EVENT (OUTPATIENT)
Dept: SURGERY | Facility: AMBULATORY SURGERY CENTER | Age: 69
End: 2019-12-13

## 2019-12-16 ENCOUNTER — HOSPITAL ENCOUNTER (OUTPATIENT)
Facility: AMBULATORY SURGERY CENTER | Age: 69
End: 2019-12-16
Attending: OTOLARYNGOLOGY
Payer: MEDICARE

## 2019-12-16 ENCOUNTER — ANESTHESIA (OUTPATIENT)
Dept: SURGERY | Facility: AMBULATORY SURGERY CENTER | Age: 69
End: 2019-12-16

## 2019-12-16 VITALS
OXYGEN SATURATION: 94 % | BODY MASS INDEX: 29.16 KG/M2 | RESPIRATION RATE: 12 BRPM | HEIGHT: 73 IN | DIASTOLIC BLOOD PRESSURE: 64 MMHG | HEART RATE: 75 BPM | SYSTOLIC BLOOD PRESSURE: 101 MMHG | TEMPERATURE: 98 F | WEIGHT: 220 LBS

## 2019-12-16 DIAGNOSIS — K11.8 PAROTID MASS: ICD-10-CM

## 2019-12-16 PROCEDURE — 88305 TISSUE EXAM BY PATHOLOGIST: CPT | Performed by: OTOLARYNGOLOGY

## 2019-12-16 PROCEDURE — 88307 TISSUE EXAM BY PATHOLOGIST: CPT | Performed by: OTOLARYNGOLOGY

## 2019-12-16 RX ORDER — OXYCODONE HYDROCHLORIDE 5 MG/1
5-10 TABLET ORAL EVERY 4 HOURS PRN
Qty: 12 TABLET | Refills: 0 | Status: SHIPPED | OUTPATIENT
Start: 2019-12-16 | End: 2020-11-16

## 2019-12-16 RX ORDER — SODIUM CHLORIDE, SODIUM LACTATE, POTASSIUM CHLORIDE, CALCIUM CHLORIDE 600; 310; 30; 20 MG/100ML; MG/100ML; MG/100ML; MG/100ML
INJECTION, SOLUTION INTRAVENOUS CONTINUOUS
Status: DISCONTINUED | OUTPATIENT
Start: 2019-12-16 | End: 2019-12-17 | Stop reason: HOSPADM

## 2019-12-16 RX ORDER — ACETAMINOPHEN 325 MG/1
325-650 TABLET ORAL EVERY 4 HOURS PRN
Qty: 25 TABLET | Refills: 0 | Status: SHIPPED | OUTPATIENT
Start: 2019-12-16 | End: 2022-03-03

## 2019-12-16 RX ORDER — FENTANYL CITRATE 50 UG/ML
25-50 INJECTION, SOLUTION INTRAMUSCULAR; INTRAVENOUS
Status: DISCONTINUED | OUTPATIENT
Start: 2019-12-16 | End: 2019-12-17 | Stop reason: HOSPADM

## 2019-12-16 RX ORDER — EPHEDRINE SULFATE 50 MG/ML
INJECTION, SOLUTION INTRAMUSCULAR; INTRAVENOUS; SUBCUTANEOUS PRN
Status: DISCONTINUED | OUTPATIENT
Start: 2019-12-16 | End: 2019-12-16

## 2019-12-16 RX ORDER — NALOXONE HYDROCHLORIDE 0.4 MG/ML
.1-.4 INJECTION, SOLUTION INTRAMUSCULAR; INTRAVENOUS; SUBCUTANEOUS
Status: DISCONTINUED | OUTPATIENT
Start: 2019-12-16 | End: 2019-12-17 | Stop reason: HOSPADM

## 2019-12-16 RX ORDER — ONDANSETRON 4 MG/1
4 TABLET, ORALLY DISINTEGRATING ORAL EVERY 30 MIN PRN
Status: DISCONTINUED | OUTPATIENT
Start: 2019-12-16 | End: 2019-12-17 | Stop reason: HOSPADM

## 2019-12-16 RX ORDER — PROPOFOL 10 MG/ML
INJECTION, EMULSION INTRAVENOUS PRN
Status: DISCONTINUED | OUTPATIENT
Start: 2019-12-16 | End: 2019-12-16

## 2019-12-16 RX ORDER — KETOROLAC TROMETHAMINE 30 MG/ML
INJECTION, SOLUTION INTRAMUSCULAR; INTRAVENOUS PRN
Status: DISCONTINUED | OUTPATIENT
Start: 2019-12-16 | End: 2019-12-16

## 2019-12-16 RX ORDER — DEXAMETHASONE SODIUM PHOSPHATE 10 MG/ML
10 INJECTION, SOLUTION INTRAMUSCULAR; INTRAVENOUS ONCE
Status: COMPLETED | OUTPATIENT
Start: 2019-12-16 | End: 2019-12-16

## 2019-12-16 RX ORDER — ACETAMINOPHEN 325 MG/1
975 TABLET ORAL ONCE
Status: COMPLETED | OUTPATIENT
Start: 2019-12-16 | End: 2019-12-16

## 2019-12-16 RX ORDER — MINERAL OIL/HYDROPHIL PETROLAT
OINTMENT (GRAM) TOPICAL 3 TIMES DAILY
Qty: 1 G | Refills: 0 | Status: SHIPPED | OUTPATIENT
Start: 2019-12-16 | End: 2022-03-03

## 2019-12-16 RX ORDER — PROPOFOL 10 MG/ML
INJECTION, EMULSION INTRAVENOUS CONTINUOUS PRN
Status: DISCONTINUED | OUTPATIENT
Start: 2019-12-16 | End: 2019-12-16

## 2019-12-16 RX ORDER — ONDANSETRON 2 MG/ML
INJECTION INTRAMUSCULAR; INTRAVENOUS PRN
Status: DISCONTINUED | OUTPATIENT
Start: 2019-12-16 | End: 2019-12-16

## 2019-12-16 RX ORDER — FENTANYL CITRATE 50 UG/ML
INJECTION, SOLUTION INTRAMUSCULAR; INTRAVENOUS PRN
Status: DISCONTINUED | OUTPATIENT
Start: 2019-12-16 | End: 2019-12-16

## 2019-12-16 RX ORDER — GLYCOPYRROLATE 0.2 MG/ML
INJECTION, SOLUTION INTRAMUSCULAR; INTRAVENOUS PRN
Status: DISCONTINUED | OUTPATIENT
Start: 2019-12-16 | End: 2019-12-16

## 2019-12-16 RX ORDER — MEPERIDINE HYDROCHLORIDE 25 MG/ML
12.5 INJECTION INTRAMUSCULAR; INTRAVENOUS; SUBCUTANEOUS
Status: DISCONTINUED | OUTPATIENT
Start: 2019-12-16 | End: 2019-12-17 | Stop reason: HOSPADM

## 2019-12-16 RX ORDER — OXYCODONE HYDROCHLORIDE 5 MG/1
5 TABLET ORAL EVERY 4 HOURS PRN
Status: DISCONTINUED | OUTPATIENT
Start: 2019-12-16 | End: 2019-12-17 | Stop reason: HOSPADM

## 2019-12-16 RX ORDER — ONDANSETRON 2 MG/ML
4 INJECTION INTRAMUSCULAR; INTRAVENOUS EVERY 30 MIN PRN
Status: DISCONTINUED | OUTPATIENT
Start: 2019-12-16 | End: 2019-12-17 | Stop reason: HOSPADM

## 2019-12-16 RX ORDER — LIDOCAINE 40 MG/G
CREAM TOPICAL
Status: DISCONTINUED | OUTPATIENT
Start: 2019-12-16 | End: 2019-12-17 | Stop reason: HOSPADM

## 2019-12-16 RX ORDER — CEFAZOLIN SODIUM 1 G/50ML
1 SOLUTION INTRAVENOUS SEE ADMIN INSTRUCTIONS
Status: DISCONTINUED | OUTPATIENT
Start: 2019-12-16 | End: 2019-12-17 | Stop reason: HOSPADM

## 2019-12-16 RX ORDER — CEFAZOLIN SODIUM 2 G/50ML
2 SOLUTION INTRAVENOUS
Status: COMPLETED | OUTPATIENT
Start: 2019-12-16 | End: 2019-12-16

## 2019-12-16 RX ORDER — LIDOCAINE HYDROCHLORIDE 20 MG/ML
INJECTION, SOLUTION INFILTRATION; PERINEURAL PRN
Status: DISCONTINUED | OUTPATIENT
Start: 2019-12-16 | End: 2019-12-16

## 2019-12-16 RX ORDER — LIDOCAINE HYDROCHLORIDE AND EPINEPHRINE 10; 10 MG/ML; UG/ML
INJECTION, SOLUTION INFILTRATION; PERINEURAL PRN
Status: DISCONTINUED | OUTPATIENT
Start: 2019-12-16 | End: 2019-12-16 | Stop reason: HOSPADM

## 2019-12-16 RX ADMIN — DEXAMETHASONE SODIUM PHOSPHATE 10 MG: 10 INJECTION, SOLUTION INTRAMUSCULAR; INTRAVENOUS at 11:52

## 2019-12-16 RX ADMIN — Medication 100 MCG: at 12:37

## 2019-12-16 RX ADMIN — CEFAZOLIN SODIUM 2 G: 2 SOLUTION INTRAVENOUS at 12:05

## 2019-12-16 RX ADMIN — PROPOFOL 200 MG: 10 INJECTION, EMULSION INTRAVENOUS at 11:59

## 2019-12-16 RX ADMIN — ONDANSETRON 4 MG: 2 INJECTION INTRAMUSCULAR; INTRAVENOUS at 11:52

## 2019-12-16 RX ADMIN — EPHEDRINE SULFATE 5 MG: 50 INJECTION, SOLUTION INTRAMUSCULAR; INTRAVENOUS; SUBCUTANEOUS at 12:38

## 2019-12-16 RX ADMIN — PROPOFOL 200 MCG/KG/MIN: 10 INJECTION, EMULSION INTRAVENOUS at 12:00

## 2019-12-16 RX ADMIN — Medication 100 MCG: at 12:19

## 2019-12-16 RX ADMIN — PROPOFOL 50 MCG/KG/MIN: 10 INJECTION, EMULSION INTRAVENOUS at 12:05

## 2019-12-16 RX ADMIN — EPHEDRINE SULFATE 10 MG: 50 INJECTION, SOLUTION INTRAMUSCULAR; INTRAVENOUS; SUBCUTANEOUS at 12:10

## 2019-12-16 RX ADMIN — SODIUM CHLORIDE, SODIUM LACTATE, POTASSIUM CHLORIDE, CALCIUM CHLORIDE: 600; 310; 30; 20 INJECTION, SOLUTION INTRAVENOUS at 10:38

## 2019-12-16 RX ADMIN — Medication 200 MCG: at 12:10

## 2019-12-16 RX ADMIN — GLYCOPYRROLATE 0.2 MG: 0.2 INJECTION, SOLUTION INTRAMUSCULAR; INTRAVENOUS at 11:52

## 2019-12-16 RX ADMIN — ACETAMINOPHEN 975 MG: 325 TABLET ORAL at 10:30

## 2019-12-16 RX ADMIN — Medication 100 MCG: at 13:05

## 2019-12-16 RX ADMIN — SODIUM CHLORIDE, SODIUM LACTATE, POTASSIUM CHLORIDE, CALCIUM CHLORIDE: 600; 310; 30; 20 INJECTION, SOLUTION INTRAVENOUS at 13:20

## 2019-12-16 RX ADMIN — Medication 100 MCG: at 12:39

## 2019-12-16 RX ADMIN — Medication 100 MCG: at 12:51

## 2019-12-16 RX ADMIN — SODIUM CHLORIDE, SODIUM LACTATE, POTASSIUM CHLORIDE, CALCIUM CHLORIDE: 600; 310; 30; 20 INJECTION, SOLUTION INTRAVENOUS at 11:44

## 2019-12-16 RX ADMIN — Medication 100 MCG: at 13:00

## 2019-12-16 RX ADMIN — LIDOCAINE HYDROCHLORIDE 100 MG: 20 INJECTION, SOLUTION INFILTRATION; PERINEURAL at 11:59

## 2019-12-16 RX ADMIN — Medication 100 MCG: at 12:24

## 2019-12-16 RX ADMIN — FENTANYL CITRATE 100 MCG: 50 INJECTION, SOLUTION INTRAMUSCULAR; INTRAVENOUS at 11:52

## 2019-12-16 RX ADMIN — KETOROLAC TROMETHAMINE 15 MG: 30 INJECTION, SOLUTION INTRAMUSCULAR; INTRAVENOUS at 13:22

## 2019-12-16 RX ADMIN — EPHEDRINE SULFATE 5 MG: 50 INJECTION, SOLUTION INTRAMUSCULAR; INTRAVENOUS; SUBCUTANEOUS at 12:52

## 2019-12-16 RX ADMIN — EPHEDRINE SULFATE 5 MG: 50 INJECTION, SOLUTION INTRAMUSCULAR; INTRAVENOUS; SUBCUTANEOUS at 12:24

## 2019-12-16 ASSESSMENT — LIFESTYLE VARIABLES: TOBACCO_USE: 1

## 2019-12-16 ASSESSMENT — MIFFLIN-ST. JEOR: SCORE: 1816.79

## 2019-12-16 NOTE — ANESTHESIA POSTPROCEDURE EVALUATION
Anesthesia POST Procedure Evaluation    Patient: Renan Rossi   MRN:     [id number] Gender:   male   Age:    69 year old :      1950        Preoperative Diagnosis: Parotid mass [K11.8]   Procedure(s):  Right Parotid surgery   Postop Comments: No value filed.       Anesthesia Type:  Not documented  General    Reportable Event: NO     PAIN: Uncomplicated   Sign Out status: Comfortable, Well controlled pain     PONV: No PONV   Sign Out status:  No Nausea or Vomiting     Neuro/Psych: Uneventful perioperative course   Sign Out Status: Preoperative baseline; Age appropriate mentation     Airway/Resp.: Uneventful perioperative course   Sign Out Status: Non labored breathing, age appropriate RR; Resp. Status within EXPECTED Parameters     CV: Uneventful perioperative course   Sign Out status: Appropriate BP and perfusion indices; Appropriate HR/Rhythm     Disposition:   Sign Out in:  PACU  Disposition:  Phase II; Home  Recovery Course: Uneventful  Follow-Up: Not required           Last Anesthesia Record Vitals:  CRNA VITALS  2019 1320 - 2019 1420      2019             Temp:  36.6  C (97.9  F)          Last PACU Vitals:  Vitals Value Taken Time   /72 2019  2:10 PM   Temp 36.6  C (97.9  F) 2019  2:10 PM   Pulse 86 2019  2:09 PM   Resp 15 2019  2:14 PM   SpO2 93 % 2019  2:14 PM   Temp src     NIBP     Pulse     SpO2     Resp     Temp 36.6  C (97.9  F) 2019  1:56 PM   Ht Rate     Temp 2     Vitals shown include unvalidated device data.      Electronically Signed By: Foster Flores MD, MD, 2019, 3:48 PM

## 2019-12-16 NOTE — DISCHARGE INSTRUCTIONS
"[facility]  Home Care Following Anesthesia  For 24 hours after surgery:  1. Get plenty of rest.  A responsible adult must stay with you for at least 24 hours after you leave the surgery center.  2. Do not drive or use heavy equipment.  If you have weakness or tingling, don't drive or use heavy equipment until this feeling goes away.   3. Do not drink alcohol.   4. Avoid strenuous or risky activities.  Ask for help when climbing stairs.  5. You may feel lightheaded.  IF so, sit for a few minutes before standing.  Have someone help you get up.   6. If you have nausea (feel sick to your stomach): Drink only clear liquids such as apple juice, ginger ale, broth or 7-Up.  Rest may also help.  Be sure to drink enough fluids.  Move to a regular diet as you feel able.   7. You may have a slight fever.  Call the doctor if your fever is over 100 F (37.7 C) (taken under the tongue) or lasts longer than 24 hours.  8. You may have a dry mouth, a sore throat, muscle aches or trouble sleeping. These should go away after 24 hours.  9. Do not make important or legal decisions.        Today you received a Marcaine or bupivacaine block to numb the nerves near your surgery site.  This is a block using local anesthetic or \"numbing\" medication injected around the nerves to anesthetize or \"numb\" the area supplied by those nerves.  This block is injected into the muscle layer near your surgical site.  The medication may numb the location where you had surgery for 6-18 hours, but may last up to 24 hours.  If your surgical site is an arm or leg you should be careful with your affected limb, since it is possible to injure your limb without being aware of it due to the numbing.  Until full feeling returns, you should guard against bumping or hitting your limb, and avoid extreme hot or cold temperatures on the skin.  As the block wears off, the feeling will return as a tingling or prickly sensation near your " surgical site.  You will experience more discomfort from your incision as the feeling returns.  You may want to take a pain pill (a narcotic or Tylenol if this was prescribed by your surgeon) when you start to experience mild pain before the pain beccomes more severe.  If your pain medications do not control your pain you should notifiy your surgeon.    Tips for taking pain medications  To get the best pain relief possible, remember these points:    Take pain medications as directed, before pain becomes severe.    Pain medication can upset your stomach: taking it with food may help.    Constipation is a common side effect of pain medication. Drink plenty of  fluids.    Eat foods high in fiber. Take a stool softener if recommended by your doctor or pharmacist.    Do not drink alcohol, drive or operate machinery while taking pain medications.    Ask about other ways to control pain, such as with heat, ice or relaxation.    Tylenol/Acetaminophen Consumption  To help encourage the safe use of acetaminophen, the makers of TYLENOL  have lowered the maximum daily dose for single-ingredient Extra Strength TYLENOL  (acetaminophen) products sold in the [place] from 8 pills per day (4,000 mg) to 6 pills per day (3,000 mg). The dosing interval has also changed from 2 pills every 4-6 hours to 2 pills every 6 hours.    If you feel your pain relief is insufficient, you may take Tylenol/Acetaminophen in addition to your narcotic pain medication.     Be careful not to exceed 3,000 mg of Tylenol/Acetaminophen in a 24 hour period from all sources.    If you are taking extra strength Tylenol/acetaminophen (500 mg), the maximum dose is 6 tablets in 24 hours.    If you are taking regular strength acetaminophen (325 mg), the maximum dose is 9 tablets in 24 hours.    Call a doctor for any of the followin. Signs of infection (fever, growing tenderness at the surgery site, a large amount of drainage or bleeding, severe pain, foul-smelling  drainage, redness, swelling).  2. It has been over 8 to 10 hours since surgery and you are still not able to urinate (pass water).  3. Headache for over 24 hours.  4. Numbness, tingling or weakness the day after surgery (if you had spinal anesthesia).  Your doctor is:       Dr. Doron Ramesh, ENT Otolaryngology: [phone]               Or dial [phone] and ask for the resident on call for:  ENT Otolaryngology  For emergency care, call the:  [facility]:  [phone] (TTY for hearing impaired: [phone])

## 2019-12-16 NOTE — OP NOTE
Procedure Date: 12/16/2019      PREOPERATIVE DIAGNOSIS:  Warthin tumor, right parotid gland.      POSTOPERATIVE DIAGNOSIS:  Warthin tumor, right parotid gland.      PROCEDURE:  Right superficial parotidectomy with facial nerve identification. Facial nerve monitoring 1.5 hours.      SURGEONS:  Abhinav Barnes MD, and Doron Ramesh MD       BLOOD LOSS:  20 mL.      COMPLICATIONS:  None.      ANESTHESIA:  General.        FACIAL NERVE MONITORING:  Facial nerve monitoring is included.       INDICATIONS FOR SURGERY:  Renan Rossi is a patient who is 69 years of age.  He had a left-sided Warthin tumor removed by Dr. Weller about 12 years ago.  He comes to the operating room now to have a 3.5 cm Warthin tumor removed.  It was not that visible on his preoperative CAT scan; however, clinically it appeared to be around the lower divisions of the facial nerve.      PROCEDURE:  After informed consent was obtained from the patient, he was brought to the operating room, and general endotracheal anesthesia was established.  The facial nerve monitor was used in all 4 branches.  We first injected into the modified Colin incision in the neck.  We went from the inferior aspect of the earlobe 2 fingerbreadths below the mandible.  We developed an incision anteriorly about 5 cm in total for his 3.5 cm tumor.  We went ahead and we went down through the subcutaneous tissue sharply and then raised a flap over the parotid gland along the SMAS.  I went ahead and identified the tumor and saw that it was visible at the lower aspect of the gland, but that was a little bit deeper as we went superiorly in the gland.  Therefore, we first went down and identified where the digastric muscle was.  We dissected the inferior aspect of the tumor out and around from the rest of the parotid gland by doing that maneuver, and we stayed at that point in time superficial to the digastric muscle.  We worked anteriorly, and we were able to stay on the capsule of the  tumor, and we identified next the cervical branches of the facial nerve.  We identified these and were able to preserve these as well by staying on the capsule of the tumor.  We had to work our way superiorly, and we had to actually go through the parotid fascia to be able to get down to the level of tumor.  We used a Prass probe throughout the entire procedure to identify any other branch of the facial nerve.  We kept identifying the cervical branch of the facial nerve and were able to stay away from this and then dissected circumferentially around the tumor in a superficial to deep fashion.  We went approximately one-jail around the tumor, and we then went to look for the lower divisions of the facial nerve.  We indeed used the Prass probe, and at a 0.6 stimulation capacity mA, we went ahead and we identified the lower divisions of the facial nerve completely and the orbicularis branch, the marginal branches and the buccal branch as well, and we were able to see these as a small area.  We identified it for about 1-1.5 cm to note that it was away from the tumor.  We then worked our way around the tumor along the capsule superiorly and deeply, and we worked our way and retracted the tumor into the neck.  We were able to take down the thin septations from the tumor and surrounding tissue, leaving just the capsule attached to the tumor itself.  We were able to go around this in its entirety in a superior to inferior fashion and retracted the tumor into the neck and remove it in its entirety.  We then washed out the neck with saline and antibiotic, actually, and then we also went ahead and identified and used the facial nerve stimulated at 0.2 to stimulate the lower divisions of the facial nerve that we had identified.  We were able to do this without significant difficulty.  There was good stimulation of all these branches, and then we went ahead and washed out the neck one last time and closed over the first  parotid fascia.  We used a couple 2-0 Vicryl sutures to close the parotid fascia, and then we inserted a rubber band-type drain and some Surgicel as well and then placed a jaw bra over the top and closed the skin with nylon.  The patient tolerated this well.         TERRY CLARK MD             D: 2019   T: 2019   MT: nani      Name:     PITER REYNOSO   MRN:      [id number]        Account:        [id number]   :      1950           Procedure Date: 2019      Document: [id number]

## 2019-12-16 NOTE — ANESTHESIA CARE TRANSFER NOTE
Patient: Renan Rossi    Procedure(s):  Right Parotid surgery    Diagnosis: Parotid mass [K11.8]  Diagnosis Additional Information: No value filed.    Anesthesia Type:   General     Note:  Airway :Face Mask  Patient transferred to:PACU  Comments: Uneventful transport to PACU; VSS; Report given to RN; Pt comfortable; IV patent: pt exchanging wellHandoff Report: Identifed the Patient, Identified the Reponsible Provider, Reviewed the pertinent medical history, Discussed the surgical course, Reviewed Intra-OP anesthesia mangement and issues during anesthesia, Set expectations for post-procedure period and Allowed opportunity for questions and acknowledgement of understanding      Vitals: (Last set prior to Anesthesia Care Transfer)    CRNA VITALS  12/16/2019 1320 - 12/16/2019 1356      12/16/2019             Resp Rate (observed):  (!) 1    Resp Rate (set):  10                Electronically Signed By: CLARISA Carver CRNA  December 16, 2019  1:56 PM

## 2019-12-16 NOTE — ANESTHESIA PREPROCEDURE EVALUATION
Anesthesia Pre-Procedure Evaluation    Patient: Renan Rossi   MRN:     [id number] Gender:   male   Age:    69 year old :      1950        Preoperative Diagnosis: Parotid mass [K11.8]   Procedure(s):  Right Extracapsular Parotidectomy     Past Medical History:   Diagnosis Date     Hypercholesteremia      Tobacco abuse       Past Surgical History:   Procedure Laterality Date     C NONSPECIFIC PROCEDURE      right kidney, blockage     COLONOSCOPY  13    Return for Colonoscopy In 1 Year     COLONOSCOPY  1-7-15    Return for Colonoscopy in 10 yrs     NECK SURGERY      [place] tumor removed,  and another one           Anesthesia Evaluation     . Pt has had prior anesthetic. Type: General    No history of anesthetic complications          ROS/MED HX    ENT/Pulmonary:  - neg pulmonary ROS   (+)tobacco use, Past use , . .    Neurologic:  - neg neurologic ROS     Cardiovascular:  - neg cardiovascular ROS       METS/Exercise Tolerance:  >4 METS   Hematologic:  - neg hematologic  ROS       Musculoskeletal:  - neg musculoskeletal ROS       GI/Hepatic:  - neg GI/hepatic ROS       Renal/Genitourinary:  - ROS Renal section negative       Endo:  - neg endo ROS       Psychiatric:  - neg psychiatric ROS       Infectious Disease:  - neg infectious disease ROS       Malignancy:         Other:                         PHYSICAL EXAM:   Mental Status/Neuro: A/A/O   Airway: Facies: Feasible  Mallampati: I  Mouth/Opening: Full  TM distance: > 6 cm  Neck ROM: Full   Respiratory: Auscultation: CTAB     Resp. Rate: Normal     Resp. Effort: Normal      CV: Rhythm: Regular  Rate: Age appropriate  Heart: Normal Sounds  Edema: None   Comments:      Dental: Normal Dentition                LABS:  CBC:   Lab Results   Component Value Date    WBC 6.6 2019    WBC 10.8 2017    HGB 15.2 2019    HGB 16.0 2017    HCT 45.5 2019    HCT 46.8 2017     2019     2017     BMP:  "  Lab Results   Component Value Date     12/09/2019     01/25/2017    POTASSIUM 4.2 12/09/2019    POTASSIUM 4.5 01/25/2017    CHLORIDE 108 12/09/2019    CHLORIDE 102 01/25/2017    CO2 24 12/09/2019    CO2 26 01/25/2017    BUN 18 12/09/2019    BUN 16 01/25/2017    CR 1.12 12/09/2019    CR 1.00 01/25/2017     (H) 12/09/2019     (H) 01/25/2017     COAGS: No results found for: PTT, INR, FIBR  POC: No results found for: BGM, HCG, HCGS  OTHER:   Lab Results   Component Value Date    A1C 6.1 (H) 02/26/2019    ROLA 9.2 12/09/2019    ALBUMIN 3.8 01/25/2017    PROTTOTAL 8.1 01/25/2017    ALT 25 01/25/2017    AST 18 01/25/2017    ALKPHOS 112 01/25/2017    BILITOTAL 0.5 01/25/2017    TSH 0.59 01/25/2017        Preop Vitals    BP Readings from Last 3 Encounters:   12/16/19 (!) 138/92   12/09/19 124/82   12/03/19 129/83    Pulse Readings from Last 3 Encounters:   12/16/19 75   12/09/19 73   12/03/19 85      Resp Readings from Last 3 Encounters:   12/16/19 16    SpO2 Readings from Last 3 Encounters:   12/16/19 97%   12/03/19 95%   07/15/19 98%      Temp Readings from Last 1 Encounters:   12/16/19 36.3  C (97.4  F) (Oral)    Ht Readings from Last 1 Encounters:   12/16/19 1.854 m (6' 1\")      Wt Readings from Last 1 Encounters:   12/16/19 99.8 kg (220 lb)    Estimated body mass index is 29.03 kg/m  as calculated from the following:    Height as of this encounter: 1.854 m (6' 1\").    Weight as of this encounter: 99.8 kg (220 lb).     LDA:        Assessment:   ASA SCORE: 1    H&P: History and physical reviewed and following examination; no interval change.   Smoking Status:  Non-Smoker/Unknown   NPO Status: NPO Appropriate     Plan:   Anes. Type:  General   Pre-Medication: None   Induction:  IV (Standard)   Airway: ETT; Oral   Access/Monitoring: PIV   Maintenance: TIVA     Postop Plan:   Postop Pain: Opioids  Postop Sedation/Airway: Not planned  Disposition: Outpatient     PONV Management:   Adult Risk " Factors:, Non-Smoker, Postop Opioids   Prevention: Ondansetron, Dexamethasone, No Volatiles     CONSENT: Direct conversation   Plan and risks discussed with: Patient                      Foster Flores MD, MD

## 2019-12-16 NOTE — BRIEF OP NOTE
[facility]    Brief Operative Note    Pre-operative diagnosis: Parotid mass [K11.8]  Post-operative diagnosis Same as pre-operative diagnosis    Procedure: Procedure(s):  Right Parotid surgery  Surgeon: Surgeon(s) and Role:     * Doron Ramesh MD - Primary     * Abhinav Barnes MD - Resident - Assisting  Anesthesia: General   Estimated blood loss: Less than 10 ml  Drains:  Rubber band   Specimens:   ID Type Source Tests Collected by Time Destination   A : right parotid tumor Tissue Parotid Gland SURGICAL PATHOLOGY EXAM Doron Ramesh MD 12/16/2019  1:14 PM    B : right parotid margin Tissue Parotid Gland SURGICAL PATHOLOGY EXAM Doron Ramesh MD 12/16/2019  1:15 PM      Findings:   None.  Complications: None.  Implants: * No implants in log *     Abhinav Barnes MD  ENT resident

## 2019-12-19 ENCOUNTER — TELEPHONE (OUTPATIENT)
Dept: OTOLARYNGOLOGY | Facility: CLINIC | Age: 69
End: 2019-12-19

## 2019-12-19 LAB — COPATH REPORT: NORMAL

## 2019-12-19 NOTE — TELEPHONE ENCOUNTER
ENT Discharge Follow-Up      Responsible Attending Physician: Dr. Doron Ramesh  Date of Discharge:  12/16  Discharge to:  Home    Current Status:  Pt is a 68 y/o m s/p right superficial parotidectomy on 12/16.  Reports pre-op symptoms improved.   Operative  pain is decreasing.  Ambulating without assistance.  Pain well controlled with current meds, ample supply.  Reports incision CDI without signs of infection.  Denies redness, swelling, increased tenderness, or elevated temp.  Denies current bowel or bladder issues.  No visible sutures/staples in place.      Discharge instructions and medication use were reviewed.  RN triage/on call number given:  [phone] or after hours and w/e - [phone].  Patient verbalized understanding and agreement with current plan.    Follow up appointments/imaging/tests needed: suture removal scheduled 12/24 at 0800 with BRONSON Amezquita and surgical f/o appointment 12/31    Natice Schwab, RN BSN

## 2019-12-20 ENCOUNTER — TELEPHONE (OUTPATIENT)
Dept: OTOLARYNGOLOGY | Facility: CLINIC | Age: 69
End: 2019-12-20

## 2019-12-20 NOTE — TELEPHONE ENCOUNTER
Pt called with recent pathology results    FINAL DIAGNOSIS:   A. 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 Right parotid margin:   - Benign parotid tissue, completely excised     Benign tumor no malignancy; pt thrilled to hear this. Plans to follow-up as scheduled.     Natice Schwab, RN BSN

## 2019-12-24 ENCOUNTER — ALLIED HEALTH/NURSE VISIT (OUTPATIENT)
Dept: OTOLARYNGOLOGY | Facility: CLINIC | Age: 69
End: 2019-12-24
Payer: MEDICARE

## 2019-12-24 DIAGNOSIS — K11.8 PAROTID MASS: Primary | ICD-10-CM

## 2019-12-24 NOTE — PROGRESS NOTES
Sutures were removed per Dr. Ramesh without any complications. Incision was dry, clean, and intact. Patient tolerated procedure well with no reports of pain. No signs or symptoms of infection. Thin layer of Aquafor was applied to the base of the incision. Patient was instructed on aftercare. Patient denies any further questions or concerns.

## 2019-12-31 ENCOUNTER — OFFICE VISIT (OUTPATIENT)
Dept: OTOLARYNGOLOGY | Facility: CLINIC | Age: 69
End: 2019-12-31
Payer: MEDICARE

## 2019-12-31 VITALS
HEART RATE: 79 BPM | DIASTOLIC BLOOD PRESSURE: 80 MMHG | BODY MASS INDEX: 29.76 KG/M2 | SYSTOLIC BLOOD PRESSURE: 123 MMHG | WEIGHT: 225.6 LBS

## 2019-12-31 DIAGNOSIS — K11.8 PAROTID MASS: Primary | ICD-10-CM

## 2019-12-31 ASSESSMENT — PAIN SCALES - GENERAL: PAINLEVEL: NO PAIN (0)

## 2019-12-31 NOTE — PATIENT INSTRUCTIONS
1. You were seen in the ENT Clinic today by Dr. Ramesh.  If you have any questions or concerns after your appointment, please call   - Option 1: ENT Clinic: [phone]  - Option 2: Melanie (Dr. Ramesh's Nurse): [phone]    2.   Plan to return to clinic in 3 months; 3/24 at 0815    Natice Schwab, RN  [facility]- Otolaryngology  [phone]

## 2019-12-31 NOTE — LETTER
2019       RE: Renan Rossi  [address] [place] [id number]     Dear Colleague,    Thank you for referring your patient, Renan Rossi, to the [facility] EAR NOSE AND THROAT at [facility]. Please see a copy of my visit note below.      Otolaryngology Clinic      Name: Renan Rossi  MRN: [id number]  Age: 69 year old  : 2019      Chief Complaint:   Surgical Followup     History of Present Illness:   Renan Rossi is a 69 year old male who presents for postoperative follow up.  The patient underwent right superficial parotidectomy for Warthin tumor on 19.  Pathology confirmed a Warthin tumor without evidence for malignancy.  He has not had any pain or other issues since surgery.     Review of Systems:   Pertinent items are noted in HPI or as in patient entered ROS below, remainder of complete ROS is negative.      Physical Exam:   /80   Pulse 79   Wt 102.3 kg (225 lb 9.6 oz)   BMI 29.76 kg/m        PHYSICAL EXAMINATION:    Constitutional:  The patient was unaccompanied, well-groomed, and in no acute distress.    Skin:  Warm and pink.    Neurologic:  Alert and oriented x 3.  CN's III-XII within normal limits.  Voice normal.   Psychiatric:  The patient's affect was calm, cooperative, and appropriate.    Respiratory:  Breathing comfortably without stridor or exertion of accessory muscles.    Eyes: Extraocular movement intact.    Head:  Normocephalic and atraumatic.    Neck:  Supple with normal laryngeal and tracheal landmarks.  Right parotidectomy incision healing well.    Lymphatic:  There is no palpable lymphadenopathy in the neck.     Data:  Surgical pathology 19:  FINAL DIAGNOSIS:   A. 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 Right parotid margin:   - Benign parotid tissue, completely excised       Assessment and Plan:  60 year old male who is approximately 2 weeks s/p right parotidectomy  for Warthin tumor.  Healing well without complication.  Follow up in approximately 3 months for recheck.         Scribe Disclosure:  I, Rosa Maria Glover, am serving as a scribe to document services personally performed by Doron Ramesh MD at this visit, based upon the provider's statements to me. All documentation has been reviewed by the aforementioned provider prior to being entered into the official medical record.     Again, thank you for allowing me to participate in the care of your patient.      Sincerely,    Doron Ramesh MD

## 2019-12-31 NOTE — PROGRESS NOTES
Otolaryngology Clinic      Name: Renan Rossi  MRN: [id number]  Age: 69 year old  : 2019      Chief Complaint:   Surgical Followup     History of Present Illness:   Renan Rossi is a 69 year old male who presents for postoperative follow up.  The patient underwent right superficial parotidectomy for Warthin tumor on 19.  Pathology confirmed a Warthin tumor without evidence for malignancy.  He has not had any pain or other issues since surgery.     Review of Systems:   Pertinent items are noted in HPI or as in patient entered ROS below, remainder of complete ROS is negative.      Physical Exam:   /80   Pulse 79   Wt 102.3 kg (225 lb 9.6 oz)   BMI 29.76 kg/m       PHYSICAL EXAMINATION:    Constitutional:  The patient was unaccompanied, well-groomed, and in no acute distress.    Skin:  Warm and pink.    Neurologic:  Alert and oriented x 3.  CN's III-XII within normal limits.  Voice normal.   Psychiatric:  The patient's affect was calm, cooperative, and appropriate.    Respiratory:  Breathing comfortably without stridor or exertion of accessory muscles.    Eyes: Extraocular movement intact.    Head:  Normocephalic and atraumatic.    Neck:  Supple with normal laryngeal and tracheal landmarks.  Right parotidectomy incision healing well.    Lymphatic:  There is no palpable lymphadenopathy in the neck.     Data:  Surgical pathology 19:  FINAL DIAGNOSIS:   A. 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 Right parotid margin:   - Benign parotid tissue, completely excised       Assessment and Plan:  60 year old male who is approximately 2 weeks s/p right parotidectomy for Warthin tumor.  Healing well without complication.  Follow up in approximately 3 months for recheck.         Scribe Disclosure:  IRosa Maria, am serving as a scribe to document services personally performed by Doron Ramesh MD at this visit, based upon the provider's  statements to me. All documentation has been reviewed by the aforementioned provider prior to being entered into the official medical record.

## 2019-12-31 NOTE — NURSING NOTE
Chief Complaint   Patient presents with     Surgical Followup     12/16/19     /80   Pulse 79   Wt 102.3 kg (225 lb 9.6 oz)   BMI 29.76 kg/m      Magda Van LPN

## 2020-03-18 ENCOUNTER — TELEPHONE (OUTPATIENT)
Dept: OTOLARYNGOLOGY | Facility: CLINIC | Age: 70
End: 2020-03-18

## 2020-03-18 NOTE — TELEPHONE ENCOUNTER
Pt called regarding upcoming appointment with Dr. Ramesh. No answer. Voicemail left explaining that in-light of everything occurring with COVID-19 we are limiting clinic visits to acutely urgent visits only.     Pt rescheduled to 4/14 at 0915.     Direct line given for additional questions/concerns.     Natice Schwab, RN BSN

## 2020-04-08 ENCOUNTER — TELEPHONE (OUTPATIENT)
Dept: OTOLARYNGOLOGY | Facility: CLINIC | Age: 70
End: 2020-04-08

## 2020-04-08 NOTE — TELEPHONE ENCOUNTER
Called patient and left a VM.    Informed him that 4/14 appointment with Dr Ramesh will be a virtual visit, due to COVID-19, and that it will now have a start time of 1:20pm.    Provided him with ENT call center number to call back if new time doesn't work or if questions arise.

## 2020-04-09 ENCOUNTER — TELEPHONE (OUTPATIENT)
Dept: OTOLARYNGOLOGY | Facility: CLINIC | Age: 70
End: 2020-04-09

## 2020-04-09 NOTE — TELEPHONE ENCOUNTER
Called patient regarding upcoming video visit. Patient states he is unable to go through process at this time. He states he will call back tomorrow.       Audrey Bush LPN

## 2020-04-10 ENCOUNTER — TELEPHONE (OUTPATIENT)
Dept: OTOLARYNGOLOGY | Facility: CLINIC | Age: 70
End: 2020-04-10

## 2020-04-13 ENCOUNTER — TELEPHONE (OUTPATIENT)
Dept: OTOLARYNGOLOGY | Facility: CLINIC | Age: 70
End: 2020-04-13

## 2020-04-13 NOTE — TELEPHONE ENCOUNTER
Spoke to patient regarding upcoming video visit with Dr. Ramesh. Patient states he would rather have telephone visit. Writer encouraged video visit, but patient continued to refuse. Writer to reschedule video visit to telephone visit.      Audrey Bush LPN

## 2020-04-14 ENCOUNTER — VIRTUAL VISIT (OUTPATIENT)
Dept: OTOLARYNGOLOGY | Facility: CLINIC | Age: 70
End: 2020-04-14
Payer: MEDICARE

## 2020-04-14 DIAGNOSIS — K11.8 PAROTID MASS: Primary | ICD-10-CM

## 2020-04-14 NOTE — PROGRESS NOTES
Otolaryngology Clinic      Name: Renan Rossi  MRN: [id number]  Age: 69 year old  : 2020      Chief Complaint:   No chief complaint on file.     History of Present Illness:   Renan Rossi is a 69 year old male who presents for postoperative follow up.  The patient underwent right superficial parotidectomy for Warthin tumor on 19.  Pathology confirmed a Warthin tumor without evidence for malignancy.  He has not had any pain or other issues since surgery. Patient notes no masses or similar.      Review of Systems:   Pertinent items are noted in HPI or as in patient entered ROS below, remainder of complete ROS is negative.      Physical Exam:   There were no vitals taken for this visit.     PHYSICAL EXAMINATION:       The neck is reportedlty well healed  No other abnormalities noted by the patient       Data:  Surgical pathology 19:  FINAL DIAGNOSIS:   A. 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 Right parotid margin:   - Benign parotid tissue, completely excised       Assessment and Plan:  70 year old male who is approximately 2 weeks s/p right parotidectomy for Warthin tumor.  Healing well without complication.  Follow up in approximately 6 months for recheck.            Doron Ramesh MD

## 2020-04-14 NOTE — PATIENT INSTRUCTIONS
1. You were seen in the ENT Clinic today by Dr. Ramesh.  If you have any questions or concerns after your appointment, please call   - Option 1: ENT Clinic: [phone]  - Option 2: Melanie (Dr. Ramesh's Nurse): [phone]    2.   Plan to return to clinic in 6 months     Natice Schwab, RN  [facility] Otolaryngology  [phone]

## 2020-11-16 ENCOUNTER — OFFICE VISIT (OUTPATIENT)
Dept: FAMILY MEDICINE | Facility: CLINIC | Age: 70
End: 2020-11-16
Payer: MEDICARE

## 2020-11-16 VITALS
HEIGHT: 73 IN | HEART RATE: 86 BPM | WEIGHT: 215 LBS | BODY MASS INDEX: 28.49 KG/M2 | SYSTOLIC BLOOD PRESSURE: 106 MMHG | DIASTOLIC BLOOD PRESSURE: 69 MMHG | TEMPERATURE: 98.1 F

## 2020-11-16 DIAGNOSIS — Z12.11 SCREEN FOR COLON CANCER: ICD-10-CM

## 2020-11-16 DIAGNOSIS — R73.01 IMPAIRED FASTING GLUCOSE: ICD-10-CM

## 2020-11-16 DIAGNOSIS — Z12.5 SCREENING FOR PROSTATE CANCER: ICD-10-CM

## 2020-11-16 DIAGNOSIS — H61.23 BILATERAL IMPACTED CERUMEN: Primary | ICD-10-CM

## 2020-11-16 LAB
HBA1C MFR BLD: 5.9 % (ref 0–5.6)
PSA SERPL-ACNC: 5.18 UG/L (ref 0–4)

## 2020-11-16 PROCEDURE — 99213 OFFICE O/P EST LOW 20 MIN: CPT | Mod: 25 | Performed by: FAMILY MEDICINE

## 2020-11-16 PROCEDURE — 69210 REMOVE IMPACTED EAR WAX UNI: CPT | Performed by: FAMILY MEDICINE

## 2020-11-16 PROCEDURE — G0103 PSA SCREENING: HCPCS | Performed by: FAMILY MEDICINE

## 2020-11-16 PROCEDURE — 83036 HEMOGLOBIN GLYCOSYLATED A1C: CPT | Performed by: FAMILY MEDICINE

## 2020-11-16 PROCEDURE — 36415 COLL VENOUS BLD VENIPUNCTURE: CPT | Performed by: FAMILY MEDICINE

## 2020-11-16 ASSESSMENT — MIFFLIN-ST. JEOR: SCORE: 1789.11

## 2020-11-16 ASSESSMENT — PAIN SCALES - GENERAL: PAINLEVEL: NO PAIN (0)

## 2020-11-16 NOTE — PROGRESS NOTES
"Subjective     Renan Rossi is a 70 year old male who presents to clinic today for the following health issues:    HPI         Clean ears       Review of Systems      Feeling  Well    Avoiding covid      Objective    /69 (BP Location: Right arm, Patient Position: Chair, Cuff Size: Adult Regular)   Pulse 86   Temp 98.1  F (36.7  C) (Oral)   Ht 1.854 m (6' 1\")   Wt 97.5 kg (215 lb)   BMI 28.37 kg/m    Body mass index is 28.37 kg/m .  Physical Exam   Full physical not done     Mentation and affect are fine    No tremor of speech or extremity    Tympanic membranes not well seen especially on left side due to cerumen  Patient wanted them cleaned out    With consent, used gentle irrigation and curette to remove all the wax  Good result  No complications    Tympanic membranes and canals fine afterward                     ASSESSMENT / PLAN:  (H61.23) Bilateral impacted cerumen  (primary encounter diagnosis)  Comment: removed here; follow up prn   Plan: REMOVE IMPACTED CERUMEN             (Z12.5) Screening for prostate cancer  Comment: psa   Plan: Prostate spec antigen screen             (R73.01) Impaired fasting glucose  Comment: check   Plan: Hemoglobin A1c             (Z12.11) Screen for colon cancer  Comment: patient had colonoscopy 2015, told to return in 10 years so up to date   Plan: as above       I reviewed the patient's medications, allergies, medical history, family history, and social history.    Madhu José MD          "

## 2020-11-16 NOTE — LETTER
"[facility]   [address] [place]  [address]  [phone]                                   November 18, 2020    Renan Rossi  [address] [facility] [address]        Dear Renan,    Here are your lab results     I tried to call     Hemoglobin a1c is stable in the \"prediabetes\" range; keep working on healthy diet/exercise     PSA a bit higher than last year but still better than 4 years ago     I will try to call again in the next few days     Results for orders placed or performed in visit on 11/16/20   Prostate spec antigen screen     Status: Abnormal   Result Value Ref Range    PSA 5.18 (H) 0 - 4 ug/L   Hemoglobin A1c     Status: Abnormal   Result Value Ref Range    Hemoglobin A1C 5.9 (H) 0 - 5.6 %       If you have any questions please call the clinic at [phone]    Sincerely,    Madhu José MD  bmd    "

## 2020-11-16 NOTE — PATIENT INSTRUCTIONS
Keep working on healthy diet/exercise     We will send you lab results    Call with questions/ problems

## 2020-11-17 ENCOUNTER — TELEPHONE (OUTPATIENT)
Dept: FAMILY MEDICINE | Facility: CLINIC | Age: 70
End: 2020-11-17

## 2020-11-18 NOTE — RESULT ENCOUNTER NOTE
"Here are your lab results    I tried to call    Hemoglobin a1c is stable in the \"prediabetes\" range; keep working on healthy diet/exercise    PSA a bit higher than last year but still better than 4 years ago    I will try to call again in the next few days    Madhu José MD  "

## 2020-11-24 NOTE — TELEPHONE ENCOUNTER
I called and discussed in detail     psa actually quite stable for 6 years; okay to keep checking annually    Discussed prediabetes also    Keep working on healthy diet/exercise     Madhu José MD

## 2020-12-24 ENCOUNTER — OFFICE VISIT (OUTPATIENT)
Dept: FAMILY MEDICINE | Facility: CLINIC | Age: 70
End: 2020-12-24
Payer: MEDICARE

## 2020-12-24 VITALS
BODY MASS INDEX: 28.96 KG/M2 | WEIGHT: 219.5 LBS | HEART RATE: 106 BPM | DIASTOLIC BLOOD PRESSURE: 76 MMHG | SYSTOLIC BLOOD PRESSURE: 117 MMHG | TEMPERATURE: 97.8 F

## 2020-12-24 DIAGNOSIS — R73.03 PREDIABETES: ICD-10-CM

## 2020-12-24 DIAGNOSIS — H69.90 DYSFUNCTION OF EUSTACHIAN TUBE, UNSPECIFIED LATERALITY: Primary | ICD-10-CM

## 2020-12-24 DIAGNOSIS — J30.9 ALLERGIC RHINITIS, UNSPECIFIED SEASONALITY, UNSPECIFIED TRIGGER: ICD-10-CM

## 2020-12-24 PROCEDURE — 99213 OFFICE O/P EST LOW 20 MIN: CPT | Performed by: FAMILY MEDICINE

## 2020-12-24 RX ORDER — FLUTICASONE PROPIONATE 50 MCG
2 SPRAY, SUSPENSION (ML) NASAL DAILY
Qty: 16 G | Refills: 11 | Status: SHIPPED | OUTPATIENT
Start: 2020-12-24

## 2020-12-24 RX ORDER — PSEUDOEPHEDRINE HCL 120 MG/1
120 TABLET, FILM COATED, EXTENDED RELEASE ORAL EVERY 12 HOURS PRN
Qty: 30 TABLET | Refills: 1 | Status: SHIPPED | OUTPATIENT
Start: 2020-12-24 | End: 2022-03-03

## 2020-12-24 NOTE — PROGRESS NOTES
Subjective     Renan Rossi is a 70 year old male who presents to clinic today for the following health issues:    HPI         Ear cleaning       Review of Systems    bothering for a while    No drainage from ear    Just left ear    Used to do flonase          Objective    /76 (BP Location: Right arm, Patient Position: Chair, Cuff Size: Adult Regular)   Pulse 106   Temp 97.8  F (36.6  C) (Oral)   Wt 99.6 kg (219 lb 8 oz)   BMI 28.96 kg/m    Body mass index is 28.96 kg/m .  Physical Exam   Full physical not done     Mentation and affect are fine    No tremor of speech or extremity    Tympanic membranes and canals normal; moderate amounf of wax right side    Nasal mucosa mildly swollen    Oral mucosa normal    No sinus / submandib tenderness     Heart and lungs fine                 ASSESSMENT / PLAN:  (H69.80) Dysfunction of Eustachian tube, unspecified laterality  (primary encounter diagnosis)  Comment: no need for ear irrigation.  No obvious infection.  As therapeutic trial use prn sudafed.  Warned of side effects.   Plan: pseudoePHEDrine (SUDAFED) 120 MG 12 hr tablet             (J30.9) Allergic rhinitis, unspecified seasonality, unspecified trigger  Comment: flonase on regular basis.  Plan: fluticasone (FLONASE) 50 MCG/ACT nasal spray             If symptoms not improving in the next couple weeks, call.      (R73.03) Prediabetes  Comment: went over recent labs in detail  Plan: keep working on healthy diet/exercise       I reviewed the patient's medications, allergies, medical history, family history, and social history.    Madhu José MD

## 2021-01-15 ENCOUNTER — TELEPHONE (OUTPATIENT)
Dept: OTOLARYNGOLOGY | Facility: CLINIC | Age: 71
End: 2021-01-15

## 2021-01-15 NOTE — TELEPHONE ENCOUNTER
Patient left voicemail at 0715. He states he would like to see Dr. Ramesh related to ear issues he has been having. He states his left ear has been plugged up and has been to his PCP for this 2x with no relief. He states that they removed wax and provided him with psuedafed for plugged up eustachian tube but this did not help and caused urinary issues. Writer scheduled patient for 1/26/21 at 0855. He was agreeable to this time and date.       Audrey Bush LPN

## 2021-01-21 ENCOUNTER — TELEPHONE (OUTPATIENT)
Dept: OTOLARYNGOLOGY | Facility: CLINIC | Age: 71
End: 2021-01-21

## 2021-01-21 NOTE — TELEPHONE ENCOUNTER
"Writer spoke with patient. Patient called regarding having hearing loss, unsudden, in both ears.  Patient described it as \"muffled\". Patient reported no pain, no discharge.  Patient stated this is a long-term issue.  Writer noted symptoms and confirmed with patient his upcoming appointment with Dr. Ramesh.  He stated is was wondering if he should see anyone else sooner. Writer informed him he should plan on his upcoming appontment in the next few days with Dr. Ramesh given the symptoms.  Patient agreed.    TIMOTHY Alcantar   "

## 2021-01-26 ENCOUNTER — OFFICE VISIT (OUTPATIENT)
Dept: OTOLARYNGOLOGY | Facility: CLINIC | Age: 71
End: 2021-01-26
Payer: COMMERCIAL

## 2021-01-26 ENCOUNTER — TELEPHONE (OUTPATIENT)
Dept: OTOLARYNGOLOGY | Facility: CLINIC | Age: 71
End: 2021-01-26

## 2021-01-26 VITALS — HEIGHT: 73 IN | WEIGHT: 215 LBS | TEMPERATURE: 97.7 F | BODY MASS INDEX: 28.49 KG/M2

## 2021-01-26 DIAGNOSIS — H69.90 DYSFUNCTION OF EUSTACHIAN TUBE, UNSPECIFIED LATERALITY: Primary | ICD-10-CM

## 2021-01-26 PROCEDURE — 69433 CREATE EARDRUM OPENING: CPT | Mod: LT | Performed by: OTOLARYNGOLOGY

## 2021-01-26 PROCEDURE — 99213 OFFICE O/P EST LOW 20 MIN: CPT | Mod: 25 | Performed by: OTOLARYNGOLOGY

## 2021-01-26 ASSESSMENT — MIFFLIN-ST. JEOR: SCORE: 1789.11

## 2021-01-26 ASSESSMENT — PAIN SCALES - GENERAL: PAINLEVEL: NO PAIN (0)

## 2021-01-26 NOTE — NURSING NOTE
"Chief Complaint   Patient presents with     RECHECK     increased ear pressure       Temperature 97.7  F (36.5  C), temperature source Temporal, height 1.854 m (6' 1\"), weight 97.5 kg (215 lb).    Lilly Nuno, EMT    "

## 2021-01-26 NOTE — PATIENT INSTRUCTIONS
1. You were seen in the ENT Clinic today by Dr. Ramesh.  If you have any questions or concerns after your appointment, please call   -  ENT Clinic: [phone]      2.   Plan to return to clinic 4-6 weeks    Audrey Bush LPN  [facility] Otolaryngology  [phone]

## 2021-01-26 NOTE — LETTER
1/26/2021       RE: Renan Rossi  [address] [place] [id number]     Dear Colleague,    Thank you for referring your patient, Renan Rossi, to the [facility] EAR NOSE AND THROAT CLINIC [place] at [facility]. Please see a copy of my visit note below.    HISTORY OF PRESENT ILLNESS:  Renan Rossi is 70 years of age.  He is here for followup today.  He is having left-sided ear muffling and cannot hear well out of that ear.  He does not have an etiology for this and may have had a cold beforehand.  He has no other current complaints at the present time today.  He is otherwise getting by reasonably well.      PHYSICAL EXAMINATION:  The patient is alert and oriented x3 and pleasant.  Skin of the face, lips, and neck on him is otherwise normal.  Palpating his parotids on both sides, there are no masses or lesions seen.  Ear canals and tympanic membranes are normal on both sides as well.  However, he gets an equivocal test lateralized to the left side as well as an equivocal Rinne test.  I examined her ears again and it did not appear that there is any fluid in her ears, but I felt that I should put in a PE tube.      PROCEDURE:  After informed consent from the patient, he was brought to the area and a put in a PE tube in the posterior inferior radial myringotomy after topical anesthesia.  I did find fluid that was suctioned with a #20 sucker and then put in a Milena Bobbin tube.  He tolerated this very well. I put Ciprodex in the tube as well.      ASSESSMENT:  Patient with a history of otitis media.      PLAN:  We will see him again over the course of the next four to six weeks.  It is unusual for him to come up with this as a problem.  He may need to have a temporal bone CT scan at some point.       Sincerely,    Doron Ramesh MD

## 2021-01-26 NOTE — PROGRESS NOTES
HISTORY OF PRESENT ILLNESS:  Renan Rossi is 70 years of age.  He is here for followup today.  He is having left-sided ear muffling and cannot hear well out of that ear.  He does not have an etiology for this and may have had a cold beforehand.  He has no other current complaints at the present time today.  He is otherwise getting by reasonably well.      PHYSICAL EXAMINATION:  The patient is alert and oriented x3 and pleasant.  Skin of the face, lips, and neck on him is otherwise normal.  Palpating his parotids on both sides, there are no masses or lesions seen.  Ear canals and tympanic membranes are normal on both sides as well.  However, he gets an equivocal test lateralized to the left side as well as an equivocal Rinne test.  I examined her ears again and it did not appear that there is any fluid in her ears, but I felt that I should put in a PE tube.      PROCEDURE:  After informed consent from the patient, he was brought to the area and a put in a PE tube in the posterior inferior radial myringotomy after topical anesthesia.  I did find fluid that was suctioned with a #20 sucker and then put in a Milena Bobbin tube.  He tolerated this very well. I put Ciprodex in the tube as well.      ASSESSMENT:  Patient with a history of otitis media.      PLAN:  We will see him again over the course of the next four to six weeks.  It is unusual for him to come up with this as a problem.  He may need to have a temporal bone CT scan at some point.

## 2021-02-23 ENCOUNTER — OFFICE VISIT (OUTPATIENT)
Dept: OTOLARYNGOLOGY | Facility: CLINIC | Age: 71
End: 2021-02-23
Payer: COMMERCIAL

## 2021-02-23 ENCOUNTER — OFFICE VISIT (OUTPATIENT)
Dept: AUDIOLOGY | Facility: CLINIC | Age: 71
End: 2021-02-23
Payer: COMMERCIAL

## 2021-02-23 VITALS
TEMPERATURE: 96.9 F | OXYGEN SATURATION: 96 % | WEIGHT: 220.46 LBS | BODY MASS INDEX: 29.22 KG/M2 | HEART RATE: 82 BPM | HEIGHT: 73 IN

## 2021-02-23 DIAGNOSIS — H69.92 DYSFUNCTION OF EUSTACHIAN TUBE, LEFT: Primary | ICD-10-CM

## 2021-02-23 DIAGNOSIS — H69.90 DYSFUNCTION OF EUSTACHIAN TUBE, UNSPECIFIED LATERALITY: Primary | ICD-10-CM

## 2021-02-23 DIAGNOSIS — H69.90 DYSFUNCTION OF EUSTACHIAN TUBE, UNSPECIFIED LATERALITY: ICD-10-CM

## 2021-02-23 PROCEDURE — 92567 TYMPANOMETRY: CPT | Performed by: AUDIOLOGIST

## 2021-02-23 PROCEDURE — 99213 OFFICE O/P EST LOW 20 MIN: CPT | Performed by: OTOLARYNGOLOGY

## 2021-02-23 ASSESSMENT — PAIN SCALES - GENERAL: PAINLEVEL: NO PAIN (0)

## 2021-02-23 ASSESSMENT — MIFFLIN-ST. JEOR: SCORE: 1808.88

## 2021-02-23 NOTE — NURSING NOTE
"Chief Complaint   Patient presents with     RECHECK     4-6 week follow up       Pulse 82, temperature 96.9  F (36.1  C), temperature source Temporal, height 1.854 m (6' 1\"), weight 100 kg (220 lb 7.4 oz), SpO2 96 %.    Lilly Nuno, EMT    "

## 2021-02-23 NOTE — PROGRESS NOTES
HISTORY OF PRESENT ILLNESS:  Renan Rossi is 71 years of age.  He is here for follow up of his PE tube.  We put a PE tube in his left ear a while ago.  He is still having some issues with intermittent eustachian tube dysfunction and popping of the ear at home on the right side, but he does not notice hearing loss with that.  He has no other new complaints at the present time today.  He did notice that he has had drops go through his eardrum and this sort of thing.  I told him to stop using the drops.      PHYSICAL EXAMINATION:  The patient is alert and oriented x3 and pleasant.  Skin of the face, lips, and neck on him is normal.  Neck exam is clear.  Ear canals, tympanic membranes are examined.  The tympanic membrane shows a posterior inferior radial myringotomy with a tube in place on the left side.  There is a fair amount of cerumen on the right side.  I cleaned cerumen in the right ear that was taken care of with magnified guidance as well as a cerumen spoon.  He tolerated this very well.      ASSESSMENT AND PLAN:  Patient with a history of a parotid mass.  He also has had problems with his ear canals.  We put a PE tube in on the left side.  We will check a tympanogram on him.  He will follow up again in about three months.

## 2021-02-23 NOTE — LETTER
2/23/2021       RE: Renan Rossi  [address] [place] [phone]     Dear Colleague,    Thank you for referring your patient, Renan Rossi, to the [facility] [place] at [facility]. Please see a copy of my visit note below.    HISTORY OF PRESENT ILLNESS:  Renan Rossi is 71 years of age.  He is here for follow up of his PE tube.  We put a PE tube in his left ear a while ago.  He is still having some issues with intermittent eustachian tube dysfunction and popping of the ear at home on the right side, but he does not notice hearing loss with that.  He has no other new complaints at the present time today.  He did notice that he has had drops go through his eardrum and this sort of thing.  I told him to stop using the drops.      PHYSICAL EXAMINATION:  The patient is alert and oriented x3 and pleasant.  Skin of the face, lips, and neck on him is normal.  Neck exam is clear.  Ear canals, tympanic membranes are examined.  The tympanic membrane shows a posterior inferior radial myringotomy with a tube in place on the left side.  There is a fair amount of cerumen on the right side.  I cleaned cerumen in the right ear that was taken care of with magnified guidance as well as a cerumen spoon.  He tolerated this very well.      ASSESSMENT AND PLAN:  Patient with a history of a parotid mass.  He also has had problems with his ear canals.  We put a PE tube in on the left side.  We will check a tympanogram on him.  He will follow up again in about three months.           Again, thank you for allowing me to participate in the care of your patient.      Sincerely,    Doron Ramesh MD

## 2021-02-23 NOTE — PROGRESS NOTES
AUDIOLOGY REPORT    SUMMARY: Audiology visit completed. See audiogram for results.      RECOMMENDATIONS: Follow-up with ENT.    Osiel Jessica, [facility]  Licensed Audiologist  [place] #[id number]

## 2021-02-23 NOTE — PATIENT INSTRUCTIONS
1. You were seen in the ENT Clinic today by Dr. Ramesh.  If you have any questions or concerns after your appointment, please call   -  ENT Clinic: [phone]      2.   Plan to return to clinic in 3months and tympanogram today.    Audrey Bush LPN  [facility] Otolaryngology  [phone]

## 2021-05-25 ENCOUNTER — OFFICE VISIT (OUTPATIENT)
Dept: OTOLARYNGOLOGY | Facility: CLINIC | Age: 71
End: 2021-05-25
Payer: COMMERCIAL

## 2021-05-25 VITALS — SYSTOLIC BLOOD PRESSURE: 108 MMHG | HEART RATE: 62 BPM | DIASTOLIC BLOOD PRESSURE: 67 MMHG | TEMPERATURE: 98.1 F

## 2021-05-25 DIAGNOSIS — K11.8 PAROTID MASS: Primary | ICD-10-CM

## 2021-05-25 PROCEDURE — 99213 OFFICE O/P EST LOW 20 MIN: CPT | Performed by: OTOLARYNGOLOGY

## 2021-05-25 ASSESSMENT — PAIN SCALES - GENERAL: PAINLEVEL: NO PAIN (0)

## 2021-05-25 NOTE — LETTER
5/25/2021       RE: Renan Rossi  [address] [place] [phone]     Dear Colleague,    Thank you for referring your patient, Renan Rossi, to the [facility] EAR NOSE AND THROAT CLINIC [place] at [facility]. Please see a copy of my visit note below.    HISTORY OF PRESENT ILLNESS:  Renan Rossi is 71 years of age.  He is here for followup today.  He has a history of a PE tube that was placed on the left side.  He had a parotidectomy on the right side.  He has no new complaints at the present time.  He is otherwise getting by quite well.  He was thinking that maybe we were going to take the tube out of his left side today as well.  No other problems with eating, drinking, breathing or swallowing.  He has no problems with Kacy syndrome or anything on the right side of his face.    PHYSICAL EXAMINATION:  The patient is alert and oriented x3.  He is very pleasant to interact with.  Skin of the face, lips and neck is otherwise normal.  There is a defect from his right parotidectomy which is quite sizable.  Ear canals and tympanic membranes are otherwise normal, but the tympanic membrane on the left side shows a PE tube is in the inferior posterior quadrant.  No other abnormalities are otherwise noted.  Neck exam is clear.    ASSESSMENT AND PLAN:  A patient with a history of a parotid tumor, doing well presently.  I will see him again in 6 months.      Again, thank you for allowing me to participate in the care of your patient.      Sincerely,    Doron Ramesh MD

## 2021-05-25 NOTE — PROGRESS NOTES
HISTORY OF PRESENT ILLNESS:  Renan Rossi is 71 years of age.  He is here for followup today.  He has a history of a PE tube that was placed on the left side.  He had a parotidectomy on the right side.  He has no new complaints at the present time.  He is otherwise getting by quite well.  He was thinking that maybe we were going to take the tube out of his left side today as well.  No other problems with eating, drinking, breathing or swallowing.  He has no problems with Kacy syndrome or anything on the right side of his face.    PHYSICAL EXAMINATION:  The patient is alert and oriented x3.  He is very pleasant to interact with.  Skin of the face, lips and neck is otherwise normal.  There is a defect from his right parotidectomy which is quite sizable.  Ear canals and tympanic membranes are otherwise normal, but the tympanic membrane on the left side shows a PE tube is in the inferior posterior quadrant.  No other abnormalities are otherwise noted.  Neck exam is clear.    ASSESSMENT AND PLAN:  A patient with a history of a parotid tumor, doing well presently.  I will see him again in 6 months.

## 2021-05-25 NOTE — PATIENT INSTRUCTIONS
1. You were seen in the ENT Clinic today by Dr. Ramesh.  If you have any questions or concerns after your appointment, please call   -  ENT Clinic: [phone]      2.   Plan to return to clinic__6 months______    Audrey Bush LPN  [facility] Otolaryngology  [phone]

## 2021-11-19 NOTE — PROGRESS NOTES
"  Otolaryngology Clinic    Name: Renan Rossi  MRN: [id number]  Age: 71 year old  : 2021      Chief Complaint:   Follow up    History of Present Illness:   Renan Rossi is a 71 year old male with a history of parotid mass who presents for follow up. I last saw the patient on 2021 at which time, he stated complications with his right PE tube popping out of his ear while at home. He did not notice any hearing loss with this and had no other complaints or symptoms of concern. At his last visit, I placed a left PE tube and he presents today for follow up.     Today, the patient reports that his ears are doing well, but his hearing is not completely back to normal.       Review of Systems:   Pertinent items are noted in HPI or as in patient entered ROS below, remainder of complete ROS is negative.    ENT ROS 12/3/2019   Neurology Numbness   Cardiopulmonary Cough   Musculoskeletal Sore or stiff joints      Physical Exam:   Ht 1.88 m (6' 2\")   Wt 93 kg (205 lb)   BMI 26.32 kg/m       PHYSICAL EXAMINATION:    The patient is alert and oriented x3.  He is very pleasant to interact with.  Skin of the face, lips and neck is otherwise normal.  There is a defect from his right parotidectomy which is quite sizable. Ear canals and tympanic membranes are otherwise normal, but the tympanic membrane on the left side shows a PE tube is in the inferior posterior quadrant. He did have some build up of cerumen in the bilateral ears and this was cleared with a curette. No other abnormalities are otherwise noted. Neck exam is clear.     Assessment and Plan:  No diagnosis found.  Renan Rossi is a 71 year old male with a history of parotid mass who presents for follow up. The patient's exam looks stable and therefore I will see him in approximately six months.     Follow-up: Return in about 6 months (around 2022).       Scribe Disclosure:  I, Jacquelyn Leon, am serving as a scribe to document services " personally performed by Doron Ramesh MD at this visit, based upon the provider's statements to me. All documentation has been reviewed by the aforementioned provider prior to being entered into the official medical record.     Scribe Preparation Attestation:  IJacquelyn, a scribe, prepared the chart for today's encounter.

## 2021-11-23 ENCOUNTER — OFFICE VISIT (OUTPATIENT)
Dept: OTOLARYNGOLOGY | Facility: CLINIC | Age: 71
End: 2021-11-23
Payer: COMMERCIAL

## 2021-11-23 VITALS — BODY MASS INDEX: 26.31 KG/M2 | HEIGHT: 74 IN | WEIGHT: 205 LBS

## 2021-11-23 DIAGNOSIS — H61.21 IMPACTED CERUMEN OF RIGHT EAR: Primary | ICD-10-CM

## 2021-11-23 PROCEDURE — 69210 REMOVE IMPACTED EAR WAX UNI: CPT | Performed by: OTOLARYNGOLOGY

## 2021-11-23 ASSESSMENT — MIFFLIN-ST. JEOR: SCORE: 1754.62

## 2021-11-23 ASSESSMENT — PAIN SCALES - GENERAL: PAINLEVEL: NO PAIN (0)

## 2021-11-23 NOTE — PATIENT INSTRUCTIONS
1. Please follow-up in clinic in 6 months   2. Please call the ENT clinic with any questions,concerns, new or worsening symptoms.    -Clinic number is [phone]   - Eleni's direct line (Dr. Ramesh's nurse) [phone]

## 2021-11-23 NOTE — LETTER
"2021       RE: Renan Rossi  [address] [place] [phone]     Dear Colleague,    Thank you for referring your patient, Renan Rossi, to the [facility] EAR NOSE AND THROAT CLINIC [place] at [facility]. Please see a copy of my visit note below.      Otolaryngology Clinic    Name: Renan Rossi  MRN: [id number]  Age: 71 year old  : 2021      Chief Complaint:   Follow up    History of Present Illness:   Renan Rossi is a 71 year old male with a history of parotid mass who presents for follow up. I last saw the patient on 2021 at which time, he stated complications with his right PE tube popping out of his ear while at home. He did not notice any hearing loss with this and had no other complaints or symptoms of concern. At his last visit, I placed a left PE tube and he presents today for follow up.     Today, the patient reports that his ears are doing well, but his hearing is not completely back to normal.       Review of Systems:   Pertinent items are noted in HPI or as in patient entered ROS below, remainder of complete ROS is negative.    ENT ROS 12/3/2019   Neurology Numbness   Cardiopulmonary Cough   Musculoskeletal Sore or stiff joints      Physical Exam:   Ht 1.88 m (6' 2\")   Wt 93 kg (205 lb)   BMI 26.32 kg/m       PHYSICAL EXAMINATION:    The patient is alert and oriented x3.  He is very pleasant to interact with.  Skin of the face, lips and neck is otherwise normal.  There is a defect from his right parotidectomy which is quite sizable. Ear canals and tympanic membranes are otherwise normal, but the tympanic membrane on the left side shows a PE tube is in the inferior posterior quadrant. He did have some build up of cerumen in the bilateral ears and this was cleared with a curette. No other abnormalities are otherwise noted. Neck exam is clear.     Assessment and Plan:  No diagnosis found.  Renan Rossi is a " 71 year old male with a history of parotid mass who presents for follow up. The patient's exam looks stable and therefore I will see him in approximately six months.     Follow-up: Return in about 6 months (around 5/23/2022).       Scribe Disclosure:  Jacquelyn NGUYỄN, am serving as a scribe to document services personally performed by Doron Ramesh MD at this visit, based upon the provider's statements to me. All documentation has been reviewed by the aforementioned provider prior to being entered into the official medical record.     Scribe Preparation Attestation:  Jacquelyn NGUYỄN, a scribe, prepared the chart for today's encounter.      Again, thank you for allowing me to participate in the care of your patient.      Sincerely,    Doron Ramesh MD

## 2021-12-22 ENCOUNTER — OFFICE VISIT (OUTPATIENT)
Dept: FAMILY MEDICINE | Facility: CLINIC | Age: 71
End: 2021-12-22
Payer: COMMERCIAL

## 2021-12-22 VITALS
OXYGEN SATURATION: 94 % | WEIGHT: 206.8 LBS | SYSTOLIC BLOOD PRESSURE: 126 MMHG | HEART RATE: 82 BPM | BODY MASS INDEX: 27.41 KG/M2 | HEIGHT: 73 IN | TEMPERATURE: 98.2 F | RESPIRATION RATE: 19 BRPM | DIASTOLIC BLOOD PRESSURE: 84 MMHG

## 2021-12-22 DIAGNOSIS — R53.83 FATIGUE, UNSPECIFIED TYPE: ICD-10-CM

## 2021-12-22 DIAGNOSIS — R63.0 LACK OF APPETITE: ICD-10-CM

## 2021-12-22 DIAGNOSIS — R05.9 COUGH: Primary | ICD-10-CM

## 2021-12-22 LAB
BASOPHILS # BLD AUTO: 0 10E3/UL (ref 0–0.2)
BASOPHILS NFR BLD AUTO: 0 %
EOSINOPHIL # BLD AUTO: 0 10E3/UL (ref 0–0.7)
EOSINOPHIL NFR BLD AUTO: 0 %
ERYTHROCYTE [DISTWIDTH] IN BLOOD BY AUTOMATED COUNT: 14.1 % (ref 10–15)
FLUAV AG SPEC QL IA: NEGATIVE
FLUBV AG SPEC QL IA: NEGATIVE
HCT VFR BLD AUTO: 40.8 % (ref 40–53)
HGB BLD-MCNC: 13.7 G/DL (ref 13.3–17.7)
LYMPHOCYTES # BLD AUTO: 1.1 10E3/UL (ref 0.8–5.3)
LYMPHOCYTES NFR BLD AUTO: 13 %
MCH RBC QN AUTO: 28.7 PG (ref 26.5–33)
MCHC RBC AUTO-ENTMCNC: 33.6 G/DL (ref 31.5–36.5)
MCV RBC AUTO: 85 FL (ref 78–100)
MONOCYTES # BLD AUTO: 0.6 10E3/UL (ref 0–1.3)
MONOCYTES NFR BLD AUTO: 7 %
MONOCYTES NFR BLD AUTO: NEGATIVE %
NEUTROPHILS # BLD AUTO: 7.1 10E3/UL (ref 1.6–8.3)
NEUTROPHILS NFR BLD AUTO: 80 %
PLATELET # BLD AUTO: 271 10E3/UL (ref 150–450)
RBC # BLD AUTO: 4.78 10E6/UL (ref 4.4–5.9)
SARS-COV-2 RNA RESP QL NAA+PROBE: NEGATIVE
WBC # BLD AUTO: 8.9 10E3/UL (ref 4–11)

## 2021-12-22 PROCEDURE — 86308 HETEROPHILE ANTIBODY SCREEN: CPT | Performed by: FAMILY MEDICINE

## 2021-12-22 PROCEDURE — U0003 INFECTIOUS AGENT DETECTION BY NUCLEIC ACID (DNA OR RNA); SEVERE ACUTE RESPIRATORY SYNDROME CORONAVIRUS 2 (SARS-COV-2) (CORONAVIRUS DISEASE [COVID-19]), AMPLIFIED PROBE TECHNIQUE, MAKING USE OF HIGH THROUGHPUT TECHNOLOGIES AS DESCRIBED BY CMS-2020-01-R: HCPCS | Performed by: FAMILY MEDICINE

## 2021-12-22 PROCEDURE — U0005 INFEC AGEN DETEC AMPLI PROBE: HCPCS | Performed by: FAMILY MEDICINE

## 2021-12-22 PROCEDURE — 99214 OFFICE O/P EST MOD 30 MIN: CPT | Performed by: FAMILY MEDICINE

## 2021-12-22 PROCEDURE — 87804 INFLUENZA ASSAY W/OPTIC: CPT | Performed by: FAMILY MEDICINE

## 2021-12-22 PROCEDURE — 36415 COLL VENOUS BLD VENIPUNCTURE: CPT | Performed by: FAMILY MEDICINE

## 2021-12-22 PROCEDURE — 85025 COMPLETE CBC W/AUTO DIFF WBC: CPT | Performed by: FAMILY MEDICINE

## 2021-12-22 ASSESSMENT — PAIN SCALES - GENERAL: PAINLEVEL: NO PAIN (0)

## 2021-12-22 ASSESSMENT — MIFFLIN-ST. JEOR: SCORE: 1744.3

## 2021-12-22 NOTE — PROGRESS NOTES
Assessment & Plan   Renan is a 70 yo M with hpld, tobacco use, parotid mass/surgery 12/19, prediabetes, elevated psa(last 7 yrs) presents with a cough fatigue and lack of appetite.    Cough  Discussed differentials including; Covid, influenza, common course, mono, pneumonia. Symptoms not consistent with pneumonia. We will check a Covid test again and influenza. Been taking Mucinex which is okay and can do either Tylenol or ibuprofen as needed.    - Influenza A/B antigen  - CBC with platelets and differential; Future  - Symptomatic; Yes COVID-19 Virus (Coronavirus) by PCR Nose  - CBC with platelets and differential    Fatigue, unspecified type  - Influenza A/B antigen  - CBC with platelets and differential; Future  - Mononucleosis screen; Future  - Symptomatic; Yes COVID-19 Virus (Coronavirus) by PCR Nose  - Mononucleosis screen  - CBC with platelets and differential    Lack of appetite  - Influenza A/B antigen  - CBC with platelets and differential; Future  - Symptomatic; Yes COVID-19 Virus (Coronavirus) by PCR Nose  - CBC with platelets and [id number]  }   Return for follow up with results.    Javi Lane MD  [facility]    Ree Urrutia is a 71 year old who presents for the following health issues  accompanied by his wife.  HPI   Fatigue, lack of appetite, cough with foamy phlegm.  Fever a wek: over 101 F on mucinex and acetaminophen  A little air hunger, no SOB, sense of taste and smell intact. Not covid  Home Covid test was negative.    Acute Illness  Acute illness concerns: Patient states it started on 12/13/2021: strep test  Symptoms: Patient states currently he is lethargy, phlemgy, and still not having an appetite and unable to sleep   Fever: YES- on the 13th, 14th and 15th   Chills/Sweats: YES- on the 13th, 14th, 15th   Headache (location?): no  Sinus Pressure: no, discharge, runny nose   Conjunctivitis:  no  Ear Pain: no  Rhinorrhea: no  Congestion: YES  Sore  "Throat: YES  Cough: YES  Wheeze: YES  Decreased Appetite: YES  Nausea: no  Vomiting: no  Diarrhea: YES  Dysuria/Freq.: no  Dysuria or Hematuria: no  Fatigue/Achiness: YES  Sick/Strep Exposure: no  Therapies tried and outcome: Mucinex, Advil plus tylenol     Review of Systems   Constitutional, cardiovascular, gi and gu systems are negative, except as otherwise noted.      Objective    /84 (BP Location: Right arm, Cuff Size: Adult Regular)   Pulse 82   Temp 98.2  F (36.8  C) (Oral)   Resp 19   Ht 1.85 m (6' 0.84\")   Wt 93.8 kg (206 lb 12.8 oz)   SpO2 94%   BMI 27.41 kg/m    Body mass index is 27.41 kg/m .  Physical Exam   GENERAL: healthy, alert and no distress  RESP: lungs clear to auscultation - no rales, rhonchi or wheezes  CV: regular rate and rhythm, no murmur, click or rub, no peripheral edema   MS: no gross musculoskeletal defects noted, no edema  SKIN: no suspicious lesions or rashes    "

## 2022-01-17 ENCOUNTER — OFFICE VISIT (OUTPATIENT)
Dept: FAMILY MEDICINE | Facility: CLINIC | Age: 72
End: 2022-01-17
Payer: COMMERCIAL

## 2022-01-17 VITALS
DIASTOLIC BLOOD PRESSURE: 74 MMHG | BODY MASS INDEX: 27.38 KG/M2 | SYSTOLIC BLOOD PRESSURE: 116 MMHG | TEMPERATURE: 98 F | OXYGEN SATURATION: 97 % | HEIGHT: 73 IN | WEIGHT: 206.6 LBS | HEART RATE: 78 BPM

## 2022-01-17 DIAGNOSIS — Z12.5 SCREENING FOR PROSTATE CANCER: ICD-10-CM

## 2022-01-17 DIAGNOSIS — E78.5 HYPERLIPIDEMIA LDL GOAL <100: ICD-10-CM

## 2022-01-17 DIAGNOSIS — G47.00 INSOMNIA, UNSPECIFIED TYPE: Primary | ICD-10-CM

## 2022-01-17 DIAGNOSIS — R73.01 IMPAIRED FASTING GLUCOSE: ICD-10-CM

## 2022-01-17 DIAGNOSIS — R53.83 FATIGUE, UNSPECIFIED TYPE: ICD-10-CM

## 2022-01-17 LAB
ALBUMIN SERPL-MCNC: 3.5 G/DL (ref 3.4–5)
ALP SERPL-CCNC: 85 U/L (ref 40–150)
ALT SERPL W P-5'-P-CCNC: 36 U/L (ref 0–70)
ANION GAP SERPL CALCULATED.3IONS-SCNC: 5 MMOL/L (ref 3–14)
AST SERPL W P-5'-P-CCNC: 19 U/L (ref 0–45)
BASOPHILS # BLD AUTO: 0 10E3/UL (ref 0–0.2)
BASOPHILS NFR BLD AUTO: 1 %
BILIRUB SERPL-MCNC: 0.4 MG/DL (ref 0.2–1.3)
BUN SERPL-MCNC: 21 MG/DL (ref 7–30)
CALCIUM SERPL-MCNC: 9.1 MG/DL (ref 8.5–10.1)
CHLORIDE BLD-SCNC: 108 MMOL/L (ref 94–109)
CHOLEST SERPL-MCNC: 184 MG/DL
CO2 SERPL-SCNC: 28 MMOL/L (ref 20–32)
CREAT SERPL-MCNC: 1.07 MG/DL (ref 0.66–1.25)
EOSINOPHIL # BLD AUTO: 0.2 10E3/UL (ref 0–0.7)
EOSINOPHIL NFR BLD AUTO: 3 %
ERYTHROCYTE [DISTWIDTH] IN BLOOD BY AUTOMATED COUNT: 15.1 % (ref 10–15)
FASTING STATUS PATIENT QL REPORTED: ABNORMAL
GFR SERPL CREATININE-BSD FRML MDRD: 74 ML/MIN/1.73M2
GLUCOSE BLD-MCNC: 93 MG/DL (ref 70–99)
HBA1C MFR BLD: 6.2 % (ref 0–5.6)
HCT VFR BLD AUTO: 41.4 % (ref 40–53)
HDLC SERPL-MCNC: 42 MG/DL
HGB BLD-MCNC: 13.5 G/DL (ref 13.3–17.7)
LDLC SERPL CALC-MCNC: 111 MG/DL
LYMPHOCYTES # BLD AUTO: 1.7 10E3/UL (ref 0.8–5.3)
LYMPHOCYTES NFR BLD AUTO: 31 %
MCH RBC QN AUTO: 28.7 PG (ref 26.5–33)
MCHC RBC AUTO-ENTMCNC: 32.6 G/DL (ref 31.5–36.5)
MCV RBC AUTO: 88 FL (ref 78–100)
MONOCYTES # BLD AUTO: 0.5 10E3/UL (ref 0–1.3)
MONOCYTES NFR BLD AUTO: 9 %
NEUTROPHILS # BLD AUTO: 3.1 10E3/UL (ref 1.6–8.3)
NEUTROPHILS NFR BLD AUTO: 57 %
NONHDLC SERPL-MCNC: 142 MG/DL
PLATELET # BLD AUTO: 247 10E3/UL (ref 150–450)
POTASSIUM BLD-SCNC: 4.3 MMOL/L (ref 3.4–5.3)
PROT SERPL-MCNC: 7.4 G/DL (ref 6.8–8.8)
PSA SERPL-MCNC: 7.02 UG/L (ref 0–4)
RBC # BLD AUTO: 4.71 10E6/UL (ref 4.4–5.9)
SODIUM SERPL-SCNC: 141 MMOL/L (ref 133–144)
TRIGL SERPL-MCNC: 155 MG/DL
TSH SERPL DL<=0.005 MIU/L-ACNC: 0.75 MU/L (ref 0.4–4)
WBC # BLD AUTO: 5.5 10E3/UL (ref 4–11)

## 2022-01-17 PROCEDURE — 99213 OFFICE O/P EST LOW 20 MIN: CPT | Performed by: FAMILY MEDICINE

## 2022-01-17 PROCEDURE — 36415 COLL VENOUS BLD VENIPUNCTURE: CPT | Performed by: FAMILY MEDICINE

## 2022-01-17 PROCEDURE — 80061 LIPID PANEL: CPT | Performed by: FAMILY MEDICINE

## 2022-01-17 PROCEDURE — 83036 HEMOGLOBIN GLYCOSYLATED A1C: CPT | Performed by: FAMILY MEDICINE

## 2022-01-17 PROCEDURE — 80053 COMPREHEN METABOLIC PANEL: CPT | Performed by: FAMILY MEDICINE

## 2022-01-17 PROCEDURE — 85025 COMPLETE CBC W/AUTO DIFF WBC: CPT | Performed by: FAMILY MEDICINE

## 2022-01-17 PROCEDURE — 84443 ASSAY THYROID STIM HORMONE: CPT | Performed by: FAMILY MEDICINE

## 2022-01-17 PROCEDURE — G0103 PSA SCREENING: HCPCS | Performed by: FAMILY MEDICINE

## 2022-01-17 ASSESSMENT — PATIENT HEALTH QUESTIONNAIRE - PHQ9
5. POOR APPETITE OR OVEREATING: NOT AT ALL
SUM OF ALL RESPONSES TO PHQ QUESTIONS 1-9: 6

## 2022-01-17 ASSESSMENT — ANXIETY QUESTIONNAIRES
3. WORRYING TOO MUCH ABOUT DIFFERENT THINGS: NOT AT ALL
1. FEELING NERVOUS, ANXIOUS, OR ON EDGE: SEVERAL DAYS
IF YOU CHECKED OFF ANY PROBLEMS ON THIS QUESTIONNAIRE, HOW DIFFICULT HAVE THESE PROBLEMS MADE IT FOR YOU TO DO YOUR WORK, TAKE CARE OF THINGS AT HOME, OR GET ALONG WITH OTHER PEOPLE: SOMEWHAT DIFFICULT
5. BEING SO RESTLESS THAT IT IS HARD TO SIT STILL: SEVERAL DAYS
GAD7 TOTAL SCORE: 3
7. FEELING AFRAID AS IF SOMETHING AWFUL MIGHT HAPPEN: NOT AT ALL
6. BECOMING EASILY ANNOYED OR IRRITABLE: SEVERAL DAYS
2. NOT BEING ABLE TO STOP OR CONTROL WORRYING: NOT AT ALL

## 2022-01-17 ASSESSMENT — MIFFLIN-ST. JEOR: SCORE: 1749.62

## 2022-01-17 NOTE — PROGRESS NOTES
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  Physical Exam  Constitutional:       Appearance: He is well-developed.   HENT:      Head: Normocephalic and atraumatic.   Eyes:      Conjunctiva/sclera: Conjunctivae normal.   Neck:      Vascular: No carotid bruit.   Cardiovascular:      Rate and Rhythm: Normal rate and regular rhythm.      Heart sounds: Normal heart sounds.   Pulmonary:      Effort: Pulmonary effort is normal. No respiratory distress.      Breath sounds: Normal breath sounds.   Neurological:      Mental Status: He is alert and oriented to person, place, and time.      Cranial Nerves: No cranial nerve deficit.   Psychiatric:         Speech: Speech normal.         Behavior: Behavior normal.           ASSESSMENT / PLAN:  (G47.00) Insomnia, unspecified type  (primary encounter diagnosis)  Comment: discussed in detail.  Patient is quite sleep deprived overall.  This is mostly the last two years since assisted but he does not appear significantly anxious or depressed on gad7 or phq9. Patient to see sleep specialist.  Try to keep med use to a minimum.   Plan: SLEEP EVALUATION & MANAGEMENT REFERRAL - ADULT         -             (R53.83) Fatigue, unspecified " type  Comment: check labs.   Plan: SLEEP EVALUATION & MANAGEMENT REFERRAL - ADULT         -, CBC with Platelets & Differential, TSH with         free T4 reflex             (E78.5) Hyperlipidemia LDL goal <100  Comment: check lab only had cereal this am   Plan: Comprehensive metabolic panel, Lipid panel         reflex to direct LDL Non-fasting             (R73.01) Impaired fasting glucose  Comment: check   Plan: Hemoglobin A1c             (Z12.5) Screening for prostate cancer  Comment: psa   Plan: Prostate Specific Antigen Screen               I reviewed the patient's medications, allergies, medical history, family history, and social history.    Madhu José MD

## 2022-01-17 NOTE — LETTER
"January 20, 2022      Renan Rossi  [address] [place] [phone]        Dear ,    We are writing to inform you of your test results.    I tried to call with these results     The prostate blood test is higher than in the past; I will try to call again in the next few days to discuss this in more detail     Hemoglobin a1c is in the \"prediabetes\" range.  No medications needed for this.  Just keep working on healthy diet/exercise.     Other labs are okay.         Resulted Orders   Lipid panel reflex to direct LDL Non-fasting   Result Value Ref Range    Cholesterol 184 <200 mg/dL    Triglycerides 155 (H) <150 mg/dL    Direct Measure HDL 42 >=40 mg/dL    LDL Cholesterol Calculated 111 (H) <=100 mg/dL    Non HDL Cholesterol 142 (H) <130 mg/dL    Patient Fasting > 8hrs? Unknown     Narrative    Cholesterol  Desirable:  <200 mg/dL    Triglycerides  Normal:  Less than 150 mg/dL  Borderline High:  150-199 mg/dL  High:  200-499 mg/dL  Very High:  Greater than or equal to 500 mg/dL    Direct Measure HDL  Female:  Greater than or equal to 50 mg/dL   Male:  Greater than or equal to 40 mg/dL    LDL Cholesterol  Desirable:  <100mg/dL  Above Desirable:  100-129 mg/dL   Borderline High:  130-159 mg/dL   High:  160-189 mg/dL   Very High:  >= 190 mg/dL    Non HDL Cholesterol  Desirable:  130 mg/dL  Above Desirable:  130-159 mg/dL  Borderline High:  160-189 mg/dL  High:  190-219 mg/dL  Very High:  Greater than or equal to 220 mg/dL   Hemoglobin A1c   Result Value Ref Range    Hemoglobin A1C 6.2 (H) 0.0 - 5.6 %      Comment:      Normal <5.7%   Prediabetes 5.7-6.4%    Diabetes 6.5% or higher     Note: Adopted from ADA consensus guidelines.   Comprehensive metabolic panel   Result Value Ref Range    Sodium 141 133 - 144 mmol/L    Potassium 4.3 3.4 - 5.3 mmol/L    Chloride 108 94 - 109 mmol/L    Carbon Dioxide (CO2) 28 20 - 32 mmol/L    Anion Gap 5 3 - 14 mmol/L    Urea Nitrogen 21 7 - 30 mg/dL    Creatinine 1.07 " 0.66 - 1.25 mg/dL    Calcium 9.1 8.5 - 10.1 mg/dL    Glucose 93 70 - 99 mg/dL    Alkaline Phosphatase 85 40 - 150 U/L    AST 19 0 - 45 U/L    ALT 36 0 - 70 U/L    Protein Total 7.4 6.8 - 8.8 g/dL    Albumin 3.5 3.4 - 5.0 g/dL    Bilirubin Total 0.4 0.2 - 1.3 mg/dL    GFR Estimate 74 >60 mL/min/1.73m2      Comment:      Effective December 21, 2021 eGFRcr in adults is calculated using the 2021 CKD-EPI creatinine equation which includes age and gender (Nan et al., [place], DOI: 10.1056/ROXQls7053446)   TSH with free T4 reflex   Result Value Ref Range    TSH 0.75 0.40 - 4.00 mU/L   Prostate Specific Antigen Screen   Result Value Ref Range    Prostate Specific Antigen Screen 7.02 (H) 0.00 - 4.00 ug/L   CBC with platelets and differential   Result Value Ref Range    WBC Count 5.5 4.0 - 11.0 10e3/uL    RBC Count 4.71 4.40 - 5.90 10e6/uL    Hemoglobin 13.5 13.3 - 17.7 g/dL    Hematocrit 41.4 40.0 - 53.0 %    MCV 88 78 - 100 fL    MCH 28.7 26.5 - 33.0 pg    MCHC 32.6 31.5 - 36.5 g/dL    RDW 15.1 (H) 10.0 - 15.0 %    Platelet Count 247 150 - 450 10e3/uL    % Neutrophils 57 %    % Lymphocytes 31 %    % Monocytes 9 %    % Eosinophils 3 %    % Basophils 1 %    Absolute Neutrophils 3.1 1.6 - 8.3 10e3/uL    Absolute Lymphocytes 1.7 0.8 - 5.3 10e3/uL    Absolute Monocytes 0.5 0.0 - 1.3 10e3/uL    Absolute Eosinophils 0.2 0.0 - 0.7 10e3/uL    Absolute Basophils 0.0 0.0 - 0.2 10e3/uL       If you have any questions or concerns, please call the clinic at the number listed above.       Sincerely,      Madhu José MD/mana

## 2022-01-17 NOTE — LETTER
January 24, 2022      Renan Rossi  [address] [place] [phone]        Dear ,    We are writing to inform you of your test results.    I tried to call you a few times regarding the elevated psa ( prostate blood test ).     It is not scary high but  high  enough  that  I do advise you see urology.  I did a referral.  Please call [phone] to schedule a consult with our urologist at [facility].          Resulted Orders   Lipid panel reflex to direct LDL Non-fasting   Result Value Ref Range    Cholesterol 184 <200 mg/dL    Triglycerides 155 (H) <150 mg/dL    Direct Measure HDL 42 >=40 mg/dL    LDL Cholesterol Calculated 111 (H) <=100 mg/dL    Non HDL Cholesterol 142 (H) <130 mg/dL    Patient Fasting > 8hrs? Unknown     Narrative    Cholesterol  Desirable:  <200 mg/dL    Triglycerides  Normal:  Less than 150 mg/dL  Borderline High:  150-199 mg/dL  High:  200-499 mg/dL  Very High:  Greater than or equal to 500 mg/dL    Direct Measure HDL  Female:  Greater than or equal to 50 mg/dL   Male:  Greater than or equal to 40 mg/dL    LDL Cholesterol  Desirable:  <100mg/dL  Above Desirable:  100-129 mg/dL   Borderline High:  130-159 mg/dL   High:  160-189 mg/dL   Very High:  >= 190 mg/dL    Non HDL Cholesterol  Desirable:  130 mg/dL  Above Desirable:  130-159 mg/dL  Borderline High:  160-189 mg/dL  High:  190-219 mg/dL  Very High:  Greater than or equal to 220 mg/dL   Hemoglobin A1c   Result Value Ref Range    Hemoglobin A1C 6.2 (H) 0.0 - 5.6 %      Comment:      Normal <5.7%   Prediabetes 5.7-6.4%    Diabetes 6.5% or higher     Note: Adopted from ADA consensus guidelines.   Comprehensive metabolic panel   Result Value Ref Range    Sodium 141 133 - 144 mmol/L    Potassium 4.3 3.4 - 5.3 mmol/L    Chloride 108 94 - 109 mmol/L    Carbon Dioxide (CO2) 28 20 - 32 mmol/L    Anion Gap 5 3 - 14 mmol/L    Urea Nitrogen 21 7 - 30 mg/dL    Creatinine 1.07 0.66 - 1.25 mg/dL    Calcium 9.1 8.5 - 10.1 mg/dL    Glucose  93 70 - 99 mg/dL    Alkaline Phosphatase 85 40 - 150 U/L    AST 19 0 - 45 U/L    ALT 36 0 - 70 U/L    Protein Total 7.4 6.8 - 8.8 g/dL    Albumin 3.5 3.4 - 5.0 g/dL    Bilirubin Total 0.4 0.2 - 1.3 mg/dL    GFR Estimate 74 >60 mL/min/1.73m2      Comment:      Effective December 21, 2021 eGFRcr in adults is calculated using the 2021 CKD-EPI creatinine equation which includes age and gender (Nan et al., NE, DOI: 10.1056/ARUNef6019109)   TSH with free T4 reflex   Result Value Ref Range    TSH 0.75 0.40 - 4.00 mU/L   Prostate Specific Antigen Screen   Result Value Ref Range    Prostate Specific Antigen Screen 7.02 (H) 0.00 - 4.00 ug/L   CBC with platelets and differential   Result Value Ref Range    WBC Count 5.5 4.0 - 11.0 10e3/uL    RBC Count 4.71 4.40 - 5.90 10e6/uL    Hemoglobin 13.5 13.3 - 17.7 g/dL    Hematocrit 41.4 40.0 - 53.0 %    MCV 88 78 - 100 fL    MCH 28.7 26.5 - 33.0 pg    MCHC 32.6 31.5 - 36.5 g/dL    RDW 15.1 (H) 10.0 - 15.0 %    Platelet Count 247 150 - 450 10e3/uL    % Neutrophils 57 %    % Lymphocytes 31 %    % Monocytes 9 %    % Eosinophils 3 %    % Basophils 1 %    Absolute Neutrophils 3.1 1.6 - 8.3 10e3/uL    Absolute Lymphocytes 1.7 0.8 - 5.3 10e3/uL    Absolute Monocytes 0.5 0.0 - 1.3 10e3/uL    Absolute Eosinophils 0.2 0.0 - 0.7 10e3/uL    Absolute Basophils 0.0 0.0 - 0.2 10e3/uL       If you have any questions or concerns, please call the clinic at the number listed above.       Sincerely,      Madhu José MD/mana

## 2022-01-18 ENCOUNTER — TELEPHONE (OUTPATIENT)
Dept: FAMILY MEDICINE | Facility: CLINIC | Age: 72
End: 2022-01-18
Payer: COMMERCIAL

## 2022-01-18 DIAGNOSIS — R97.20 ELEVATED PROSTATE SPECIFIC ANTIGEN (PSA): Primary | ICD-10-CM

## 2022-01-18 ASSESSMENT — ANXIETY QUESTIONNAIRES: GAD7 TOTAL SCORE: 3

## 2022-01-19 NOTE — RESULT ENCOUNTER NOTE
"I tried to call with these results    The prostate blood test is higher than in the past; I will try to call again in the next few days to discuss this in more detail    Hemoglobin a1c is in the \"prediabetes\" range.  No medications needed for this.  Just keep working on healthy diet/exercise.    Other labs are okay.    Madhu José MD  "

## 2022-01-23 NOTE — TELEPHONE ENCOUNTER
I tried to call again.  Patient not available.  I will do another result note and advise patient to see urology due to elevated psa.  I did referral.   Madhu José MD

## 2022-01-23 NOTE — RESULT ENCOUNTER NOTE
Hi again Mr Rossi    I tried to call you a few times regarding the elevated psa ( prostate blood test ).    It is not scary high but  high  enough  that  I do advise you see urology.  I did a referral.  Please call [phone] to schedule a consult with our urologist at [facility].    Take care.    Madhu José MD

## 2022-03-03 ENCOUNTER — OFFICE VISIT (OUTPATIENT)
Dept: SLEEP MEDICINE | Facility: CLINIC | Age: 72
End: 2022-03-03
Payer: COMMERCIAL

## 2022-03-03 VITALS
SYSTOLIC BLOOD PRESSURE: 131 MMHG | HEART RATE: 78 BPM | OXYGEN SATURATION: 99 % | HEIGHT: 73 IN | BODY MASS INDEX: 27.11 KG/M2 | DIASTOLIC BLOOD PRESSURE: 84 MMHG | WEIGHT: 204.6 LBS

## 2022-03-03 DIAGNOSIS — G47.00 INSOMNIA, UNSPECIFIED TYPE: Primary | ICD-10-CM

## 2022-03-03 DIAGNOSIS — G47.22 ADVANCED SLEEP PHASE SYNDROME: ICD-10-CM

## 2022-03-03 PROCEDURE — 99205 OFFICE O/P NEW HI 60 MIN: CPT | Performed by: PHYSICIAN ASSISTANT

## 2022-03-03 NOTE — NURSING NOTE
"Chief Complaint   Patient presents with     Sleep Problem     sleep pattern       Initial /84   Pulse 78   Ht 1.854 m (6' 1\")   Wt 92.8 kg (204 lb 9.6 oz)   SpO2 99%   BMI 26.99 kg/m   Estimated body mass index is 26.99 kg/m  as calculated from the following:    Height as of this encounter: 1.854 m (6' 1\").    Weight as of this encounter: 92.8 kg (204 lb 9.6 oz).    Medication Reconciliation: complete  ESS:3  Neck circumference: 39 centimeters.  Jhoan Browne CNA    "

## 2022-03-03 NOTE — PATIENT INSTRUCTIONS
Each of us has a built in tendency to find it easier to stay up late at night or get up early in the morning.  Being a  night owl  or  early bird  is a function of our internal body clock.  When you are forced to keep a sleep schedule that goes against your typical habits (because a job or other responsibilities) insomnia can be the result.  Try the following changes to see if you can improve your sleep patterns:    Pick a sleep and wake schedule with about 7 hours in bed that is consistent.  That means keep the same bedtime and rise time every day of the week including the weekends. You can try to slowly delay your bedtime by 15-30 minutes every week until desired bedtime achieved - 9:00PM may be a good compromise     Try to get outside for at least 30 minutes but preferably up to 2 hours to get some bright sunlight.  Bright light is the best way to  set  your internal body clock and bright light exposure before bedtime may help delay your tendency to fall asleep too early. Use of a SAD lamp/light therapy box with 10,000 lux is a great alternative to sunlight    Keep as busy as possible in the evening hours to avoid falling asleep.  Try to  avoid sedentary activities as much as possible in the evening too.      Stop use of phone in middle of the night     Stop caffeine in middle of the night

## 2022-03-03 NOTE — PROGRESS NOTES
"[facility]   Outpatient Sleep Medicine Consultation  Mar 3, 2022       Name: Renan Rossi MRN# [id number]   Age: 72 year old YOB: 1950     Date of Consultation: Mar 3, 2022   Consultation is requested by: No referring provider defined for this encounter. No ref. provider found  Primary care provider: Madhu José         Assessment and Plan:   1. Insomnia, unspecified type  2. Advanced sleep phase syndrome    Presents to clinic today for evaluation of insomnia. When appointment was made, was having significant difficulty both asleep and staying asleep but sleep has been better as of late. Appears he has a tendency for advanced sleep phase which he is not particularly bothered by. During our discussion however it is apparent there are changes in sleep hygiene that would benefit his sleep. Discussed use of bright light (either natural sunlight or SAD lamp/light box) in the evening to help him stay awake if wanting to shift his sleep schedule back a bit. Ideally, patient will go for a walk outside in afternoon/evening as regular exercise would also help his sleep. Suspect some sleep state misperception from hours of 1-5:00AM, likely sleeping more during this time than he reports and even admitted \"I guess I would be annoyed if I really laid there 4 hours but I'm not annoyed during that time\". He will stop making coffee in middle of the night and instead switch to decaffeinated tea such as chamomile if likes the warm drink. Encouraged him to stop use of phone in middle of the night. Discussed stopping Advil PM. No sleep study ordered today as he denies any abnormal movements or behaviors in sleep and denies classic symptoms of DAVID. Does admit that he can snore but not habitual and not loud. Patient will implement behavioral changes as above and return to clinic as needed.        Chief Complaint / Reason for Sleep Consult:     Chief Complaint   Patient presents with     Sleep Problem " "    sleep pattern          History of Present Illness:     Renan Rossi is a 72 year old male who presents to the clinic for evaluation of insomnia. Other past medical history significant for prediabetes, HLD.     Patient reports significant insomnia issue with ~2 hours per night of sleep when sleep consult appt was made, has since been sleeping much better. Patient states \"I think it was a combination of winter and darkness and not doing yard work and feeling trapped\". Also stopped his part time job at the time. Was having difficulty both falling asleep and staying asleep even with OTC sleep aids - trialed Zzquil and melatonin.     Sleep schedule: Has always been more of a early bird. Has always felt he required less sleep than others. Estimates he is sleeping 4-6 hours a night, states \"I feel fine\" with this amount of sleep. Gets tired at 7:00PM and will take Advil PM. Asleep by 7:30PM and wakes between 11:30-12:30AM. Feels very awake at this time, typically gets up to use restroom then will go to kitchen and make coffee. Drinks coffee while looking at his phone. Returns to bed around 1:00AM and will be in/out of sleep until he wakes normally around 5:00AM.     Patient states \"I can stay up if there is a reason for me to stay up and I can go to bed when other people go to bed but there is nothing on TV that interests me what so ever so I just look forward to going to bed at 7 unless there is something to keep me up then I am more than happy to participate in it\".     He sleeps alone, has been in different bedroom from wife for years. Does report some snoring, or at least this has been commented on in the past but does not wake himself up snoring and snoring is not described as loud to him. No witnessed apneic events. No gasping/choking arousals. No nocturnal GERD. No morning headaches. No morning dry mouth.  No morning nasal congestion.     Denies daytime napping. Reports when he naps he wakes up feeling \"awful\" so " avoids all together. Does not endorse daytime sleepiness. He had a total [place] Sleepiness Scale of 3 (22 1303), with scores of 10 or higher indicating abnormal levels of sleepiness. No history of driving accident or near miss due to sleepiness/drowiness.     Denies restless legs syndrome symptoms. Denies kicking/leg movements in sleep. No dream enactment. No recurring nightmares or night terrors. No sleep walking or sleep eating. No sleep talking. No bruxism. No sleep paralysis. No waking dreams.     SCALES       SLEEP APNEA: Stopbang score  2/8       INSOMNIA:  Insomnia severity score:    absence of insomnia (0-7); sub-threshold insomnia (8-14); moderate insomnia (15-21); and severe insomnia (22-28)       SLEEPINESS: [place] sleepiness scale: 3/24 [normal < 11]          Medications:     Current Outpatient Medications   Medication Sig     fluticasone (FLONASE) 50 MCG/ACT nasal spray Spray 2 sprays into both nostrils daily     No current facility-administered medications for this visit.           Allergies:     Allergies   Allergen Reactions     Contrast Dye Rash     pt had a rash from iv contrast 25years ago          Past Medical History:     Past Medical History:   Diagnosis Date     Hypercholesteremia      Tobacco abuse            Past Surgical History:      Past Surgical History:   Procedure Laterality Date     COLONOSCOPY  13    Return for Colonoscopy In 1 Year     COLONOSCOPY  1-7-15    Return for Colonoscopy in 10 yrs     NECK SURGERY      Warthin tumor removed,  and another one 2019     PAROTIDECTOMY Right 2019    Procedure: Right Parotid surgery;  Surgeon: Doron Ramesh MD;  Location:  OR     [facility] NONSPECIFIC PROCEDURE  1975    right kidney, blockage          Social History:     Social History     Tobacco Use     Smoking status: Former Smoker     Types: Cigarettes     Quit date: 7/15/2012     Years since quittin.6     Smokeless tobacco: Never Used   Substance Use  "Topics     Alcohol use: No          Family History:     Family History   Problem Relation Age of Onset     Cancer - colorectal Mother            Review of Systems:   A complete 10 point review of systems was negative other than HPI or as commented below:   postnasal drainage, runny nose, coughing up mucus, muscle pain, depression, anxiety         Physical Examination:   /84   Pulse 78   Ht 1.854 m (6' 1\")   Wt 92.8 kg (204 lb 9.6 oz)   SpO2 99%   BMI 26.99 kg/m    General appearance: Awake, alert, cooperative. Well groomed. Sitting comfortably in chair. In no apparent distress.  HEENT: Head: Normocephalic, atraumatic. Eyes: PERRL. Conjunctiva clear. Sclera normal. Remainder of face covered by mask and exam deferred.   Neck:  Neck Cir (cm): 39 cm (3/3/2022  1:03 PM)  Pulmonary:  Able to speak easily in full sentences. No cough or wheeze.   Skin:  No rashes or significant lesions on visible skin.   Neurologic: Alert, oriented x3. No focal neurological deficit. Gait normal.   Psychiatric: Mood euthymic. Affect congruent with full range and intensity.          Data: All pertinent previous laboratory data reviewed     No results found for: PH, PHARTERIAL, PO2, [id number], SAT, PCO2, HCO3, BASEEXCESS, ENRIQUETA, BEB  Lab Results   Component Value Date    TSH 0.75 01/17/2022    TSH 0.59 01/25/2017     Lab Results   Component Value Date    GLC 93 01/17/2022     (H) 12/09/2019     Lab Results   Component Value Date    HGB 13.5 01/17/2022    HGB 13.7 12/22/2021     Lab Results   Component Value Date    BUN 21 01/17/2022    BUN 18 12/09/2019    CR 1.07 01/17/2022    CR 1.12 12/09/2019     Lab Results   Component Value Date    AST 19 01/17/2022    AST 18 01/25/2017    ALT 36 01/17/2022    ALT 25 01/25/2017    ALKPHOS 85 01/17/2022    ALKPHOS 112 01/25/2017    BILITOTAL 0.4 01/17/2022    BILITOTAL 0.5 01/25/2017     No results found for: UAMP, UBARB, BENZODIAZEUR, UCANN, UCOC, OPIT, UPCP    Chest x-ray:   XR CHEST 2 " VW  1/25/2017 7:37 AM     HISTORY:  Cough     COMPARISON:  None.                   IMPRESSION:  Hyperinflation. Otherwise negative. No infiltrates.     Copy to: Madhu José PA-C  Mar 3, 2022     [facility]  [address] , [place] [address]     [facility]  [address] [facility] [address], [place] [address]    Chart documentation was completed, in part, with Dezide voice-recognition software. Even though reviewed, some grammatical, spelling, and word errors may remain.    64 minutes spent on day of encounter doing chart review, history and exam, documentation, and further activities as noted above

## 2022-05-24 ENCOUNTER — OFFICE VISIT (OUTPATIENT)
Dept: OTOLARYNGOLOGY | Facility: CLINIC | Age: 72
End: 2022-05-24
Payer: COMMERCIAL

## 2022-05-24 VITALS
WEIGHT: 204 LBS | TEMPERATURE: 97.8 F | HEIGHT: 73 IN | OXYGEN SATURATION: 95 % | SYSTOLIC BLOOD PRESSURE: 111 MMHG | HEART RATE: 74 BPM | BODY MASS INDEX: 27.04 KG/M2 | DIASTOLIC BLOOD PRESSURE: 72 MMHG

## 2022-05-24 DIAGNOSIS — H61.21 IMPACTED CERUMEN OF RIGHT EAR: ICD-10-CM

## 2022-05-24 DIAGNOSIS — H69.90 DYSFUNCTION OF EUSTACHIAN TUBE, UNSPECIFIED LATERALITY: Primary | ICD-10-CM

## 2022-05-24 PROCEDURE — 99213 OFFICE O/P EST LOW 20 MIN: CPT | Performed by: OTOLARYNGOLOGY

## 2022-05-24 ASSESSMENT — PAIN SCALES - GENERAL: PAINLEVEL: NO PAIN (0)

## 2022-05-24 NOTE — PROGRESS NOTES
Otolaryngology Clinic    Name: Renan Rossi  MRN: [id number]  Age: 72 year old  : 2022      Chief Complaint:   Follow up     History of Present Illness:   Renan Rossi is a 72 year old male with a history of parotid mass and right PE tube who presents for follow up. He tells me that his ears have been stable since our last visit.      Review of Systems:   Pertinent items are noted in HPI or as in patient entered ROS below, remainder of complete ROS is negative.    ENT ROS 12/3/2019   Neurology: Numbness   Cardiopulmonary: Cough   Musculoskeletal: Sore or stiff joints        Physical Exam:   There were no vitals taken for this visit.     PHYSICAL EXAMINATION:    Constitutional:  The patient was unaccompanied, well-groomed, and in no acute distress.    Skin:  Warm and pink.    Neurologic:  Alert and oriented x 3.  CN's III-XII within normal limits.  Voice normal.   Psychiatric:  The patient's affect was calm, cooperative, and appropriate.    Respiratory:  Breathing comfortably without stridor or exertion of accessory muscles.    Eyes: Extraocular movement intact.    Head:  Normocephalic and atraumatic.  No lesions or scars.    Ears:  Pinnae and tragus non-tender. Right and Left EAC's was debrided of cerumen.  TM's were clear.  Left PE tube has extruded but there is a residual posterior inferior pinpoint perforation.   Nose:  Sinuses were non-tender.  Anterior rhinoscopy revealed midline septum and absence of purulence or polyps.    OC/OP:  Normal tongue, floor of mouth, buccal mucosa, and palate.  No lesions or masses on inspection or palpation.  No abnormal lymph tissue in the oropharynx.  The pterygoid region is non-tender.    Neck:  Supple with normal laryngeal and tracheal landmarks.  The parotid beds were without masses.  No palpable thyroid.  Lymphatic:  There is no palpable lymphadenopathy in the neck.      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 Since our last visit his left PE tube has extruded but there is a perforation that seems to be aerating the ear well. I will have him follow-up audiogram.      Scribe Disclosure:  I, Marco Muñiz, am serving as a scribe to document services personally performed by Doron Ramesh MD at this visit, based upon the provider's statements to me. All documentation has been reviewed by the aforementioned provider prior to being entered into the official medical record.

## 2022-05-24 NOTE — NURSING NOTE
"Chief Complaint   Patient presents with     RECHECK     6 month follow up       Blood pressure 111/72, pulse 74, temperature 97.8  F (36.6  C), temperature source Temporal, height 1.854 m (6' 1\"), weight 92.5 kg (204 lb), SpO2 95 %.    Lilly Nuno, EMT    "

## 2022-05-24 NOTE — LETTER
2022       RE: Renan Rossi  [address] [place] [phone]     Dear Colleague,    Thank you for referring your patient, Renan Rossi, to the [facility] [place] at [facility]. Please see a copy of my visit note below.      Otolaryngology Clinic    Name: Renan Rossi  MRN: [id number]  Age: 72 year old  : 2022      Chief Complaint:   Follow up     History of Present Illness:   Renan Rossi is a 72 year old male with a history of parotid mass and right PE tube who presents for follow up. He tells me that his ears have been stable since our last visit.      Review of Systems:   Pertinent items are noted in HPI or as in patient entered ROS below, remainder of complete ROS is negative.    ENT ROS 12/3/2019   Neurology: Numbness   Cardiopulmonary: Cough   Musculoskeletal: Sore or stiff joints        Physical Exam:   There were no vitals taken for this visit.     PHYSICAL EXAMINATION:    Constitutional:  The patient was unaccompanied, well-groomed, and in no acute distress.    Skin:  Warm and pink.    Neurologic:  Alert and oriented x 3.  CN's III-XII within normal limits.  Voice normal.   Psychiatric:  The patient's affect was calm, cooperative, and appropriate.    Respiratory:  Breathing comfortably without stridor or exertion of accessory muscles.    Eyes: Extraocular movement intact.    Head:  Normocephalic and atraumatic.  No lesions or scars.    Ears:  Pinnae and tragus non-tender. Right and Left EAC's was debrided of cerumen.  TM's were clear.  Left PE tube has extruded but there is a residual posterior inferior pinpoint perforation.   Nose:  Sinuses were non-tender.  Anterior rhinoscopy revealed midline septum and absence of purulence or polyps.    OC/OP:  Normal tongue, floor of mouth, buccal mucosa, and palate.  No lesions or masses on inspection or palpation.  No abnormal lymph tissue in the  oropharynx.  The pterygoid region is non-tender.    Neck:  Supple with normal laryngeal and tracheal landmarks.  The parotid beds were without masses.  No palpable thyroid.  Lymphatic:  There is no palpable lymphadenopathy in the neck.      Assessment and Plan:  Renan Rossi is a 72 year old male who is now a few years s/p dissection for pleomorphic adenoma. Since our last visit his left PE tube has extruded but there is a perforation that seems to be aerating the ear well. I will have him follow-up audiogram.      Scribe Disclosure:  I, Marco Muñiz, am serving as a scribe to document services personally performed by Doron Ramesh MD at this visit, based upon the provider's statements to me. All documentation has been reviewed by the aforementioned provider prior to being entered into the official medical record.      Again, thank you for allowing me to participate in the care of your patient.      Sincerely,    Doron Ramesh MD

## 2022-05-24 NOTE — PATIENT INSTRUCTIONS
"You were seen in the clinic today by Dr. Ramesh. If you have any questions or concerns after your appointment, please call the clinic at [phone]. Press \"1\" for scheduling, press \"3\" for nurse advice.    2.   The following has been recommended for you based upon your appointment today:   -Obtain hearing test in 6 months.   -Call if any recurrent issues with your ears in case you need a repeat cleaning.    3.   Plan to return the clinic after hearing test in 6 months.       Eleni Cano [facility]  Department of Otolaryngology  [phone]   "

## 2022-09-16 NOTE — PROGRESS NOTES
"  Otolaryngology Clinic    Name: eRnan Rossi  MRN: [id number]  Age: 72 year old  : 2022      Chief Complaint:   Follow up     History of Present Illness:   Renan Rossi is a 72 year old male with a history of parotid mass and right PE tube who presents for follow up. Today he tells me that he is doing well and going on a family trip soon. Recently on  he noticed a postauricular lump on the right side which onset seemingly over night. It started as the size of a large grape which has since decreased in size. He had some right sided ear pain and jaw pain with this so he was unable to open his mouth. He is not currently on antibiotics.      Review of Systems:   Pertinent items are noted in HPI or as in patient entered ROS below, remainder of complete ROS is negative.    ENT ROS 12/3/2019   Neurology: Numbness   Cardiopulmonary: Cough   Musculoskeletal: Sore or stiff joints        Physical Exam:   /80 (BP Location: Left arm, Patient Position: Sitting, Cuff Size: Adult Regular)   Pulse 88   Ht 1.854 m (6' 1\")   Wt 91.6 kg (202 lb)   SpO2 93%   BMI 26.65 kg/m       PHYSICAL EXAMINATION:    Constitutional:  The patient was unaccompanied, well-groomed, and in no acute distress.    Skin:  Warm and pink.    Neurologic:  Alert and oriented x 3.  CN's III-XII within normal limits.  Voice normal.   Psychiatric:  The patient's affect was calm, cooperative, and appropriate.    Respiratory:  Breathing comfortably without stridor or exertion of accessory muscles.    Eyes: Extraocular movement intact.    Head:  Normocephalic and atraumatic.  No lesions or scars.    Ears:  Pinnae and tragus non-tender.  EAC's and TM's were clear. Left PE tube has extruded but there is a residual posterior inferior pinpoint perforation.   Nose:  Sinuses were non-tender.  Anterior rhinoscopy revealed midline septum and absence of purulence or polyps.    OC/OP:  Normal tongue, floor of mouth, buccal mucosa, and palate.  " No lesions or masses on inspection or palpation.  No abnormal lymph tissue in the oropharynx.  The pterygoid region is non-tender.    Neck:  Supple with normal laryngeal and tracheal landmarks.  The right parotid bed had 1.5-2.5 cm mass at tail of parotid. No palpable thyroid.  Lymphatic:  There is no palpable lymphadenopathy in the neck.      Assessment and Plan:  No diagnosis found.  Renan Rossi is a 72 year old male with a history of parotid mass and right PE tube who presents for follow up. He is now a few years s/p dissection of pleomorphic adenoma. Given that the parotid bed mass has decreased since the onset I do not feel antibiotics are indicated at this time and I will plan to see him again in 3-4 weeks for recheck of this. 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 I discussed if there is still a mass present at that time he will need a repeat CT scan.     Follow-up: No follow-ups on file.      Scribe Disclosure:  I, Genet Brown, am serving as a scribe to document services personally performed by Doron Ramesh MD at this visit, based upon the provider's statements to me. All documentation has been reviewed by the aforementioned provider prior to being entered into the official medical record.

## 2022-09-20 ENCOUNTER — OFFICE VISIT (OUTPATIENT)
Dept: OTOLARYNGOLOGY | Facility: CLINIC | Age: 72
End: 2022-09-20
Payer: COMMERCIAL

## 2022-09-20 VITALS
BODY MASS INDEX: 26.77 KG/M2 | DIASTOLIC BLOOD PRESSURE: 80 MMHG | WEIGHT: 202 LBS | SYSTOLIC BLOOD PRESSURE: 125 MMHG | HEART RATE: 88 BPM | OXYGEN SATURATION: 93 % | HEIGHT: 73 IN

## 2022-09-20 DIAGNOSIS — K11.8 PAROTID MASS: Primary | ICD-10-CM

## 2022-09-20 PROCEDURE — 99213 OFFICE O/P EST LOW 20 MIN: CPT | Performed by: OTOLARYNGOLOGY

## 2022-09-20 ASSESSMENT — PAIN SCALES - GENERAL: PAINLEVEL: NO PAIN (0)

## 2022-09-20 NOTE — LETTER
"2022       RE: Renan Rossi  [address] [place] [id number]     Dear Colleague,    Thank you for referring your patient, Renan Rossi, to the [facility] EAR NOSE AND THROAT CLINIC [place] at [facility]. Please see a copy of my visit note below.      Otolaryngology Clinic    Name: Renan Rossi  MRN: [id number]  Age: 72 year old  : 2022      Chief Complaint:   Follow up     History of Present Illness:   Renan Rossi is a 72 year old male with a history of parotid mass and right PE tube who presents for follow up. Today he tells me that he is doing well and going on a family trip soon. Recently on  he noticed a postauricular lump on the right side which onset seemingly over night. It started as the size of a large grape which has since decreased in size. He had some right sided ear pain and jaw pain with this so he was unable to open his mouth. He is not currently on antibiotics.      Review of Systems:   Pertinent items are noted in HPI or as in patient entered ROS below, remainder of complete ROS is negative.    ENT ROS 12/3/2019   Neurology: Numbness   Cardiopulmonary: Cough   Musculoskeletal: Sore or stiff joints        Physical Exam:   /80 (BP Location: Left arm, Patient Position: Sitting, Cuff Size: Adult Regular)   Pulse 88   Ht 1.854 m (6' 1\")   Wt 91.6 kg (202 lb)   SpO2 93%   BMI 26.65 kg/m       PHYSICAL EXAMINATION:    Constitutional:  The patient was unaccompanied, well-groomed, and in no acute distress.    Skin:  Warm and pink.    Neurologic:  Alert and oriented x 3.  CN's III-XII within normal limits.  Voice normal.   Psychiatric:  The patient's affect was calm, cooperative, and appropriate.    Respiratory:  Breathing comfortably without stridor or exertion of accessory muscles.    Eyes: Extraocular movement intact.    Head:  Normocephalic and atraumatic.  No lesions or scars.    Ears:  Pinnae and " tragus non-tender.  EAC's and TM's were clear. Left PE tube has extruded but there is a residual posterior inferior pinpoint perforation.   Nose:  Sinuses were non-tender.  Anterior rhinoscopy revealed midline septum and absence of purulence or polyps.    OC/OP:  Normal tongue, floor of mouth, buccal mucosa, and palate.  No lesions or masses on inspection or palpation.  No abnormal lymph tissue in the oropharynx.  The pterygoid region is non-tender.    Neck:  Supple with normal laryngeal and tracheal landmarks.  The right parotid bed had 1.5-2.5 cm mass at tail of parotid. No palpable thyroid.  Lymphatic:  There is no palpable lymphadenopathy in the neck.      Assessment and Plan:  No diagnosis found.  Renan Rossi is a 72 year old male with a history of parotid mass and right PE tube who presents for follow up. He is now a few years s/p dissection of pleomorphic adenoma. Given that the parotid bed mass has decreased since the onset I do not feel antibiotics are indicated at this time and I will plan to see him again in 3-4 weeks for recheck of this. 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 I discussed if there is still a mass present at that time he will need a repeat CT scan.     Follow-up: No follow-ups on file.      Scribe Disclosure:  I, Genet Brown, am serving as a scribe to document services personally performed by Doron Ramesh MD at this visit, based upon the provider's statements to me. All documentation has been reviewed by the aforementioned provider prior to being entered into the official medical record.      Again, thank you for allowing me to participate in the care of your patient.      Sincerely,    Doron Ramesh MD

## 2022-09-20 NOTE — NURSING NOTE
"Chief Complaint   Patient presents with     RECHECK     Parotid mass         Blood pressure 125/80, pulse 88, height 1.854 m (6' 1\"), weight 91.6 kg (202 lb), SpO2 93 %.    Lilly Nuno, EMT    "

## 2022-09-20 NOTE — PATIENT INSTRUCTIONS
"You were seen in the clinic today by Dr. Ramesh. If you have any questions or concerns after your appointment, please call the clinic at [phone]. Press \"1\" for scheduling, press \"3\" for nurse advice.    2.   Plan to return the clinic in 3-4 weeks.       Irina Mcdonald LPN  [facility]  Department of Otolaryngology  [phone]   "

## 2022-10-05 ENCOUNTER — TELEPHONE (OUTPATIENT)
Dept: OTOLARYNGOLOGY | Facility: CLINIC | Age: 72
End: 2022-10-05

## 2022-10-05 NOTE — TELEPHONE ENCOUNTER
[facility] Call Center    Phone Message    May a detailed message be left on voicemail: yes     Reason for Call: Symptoms or Concerns     If patient has red-flag symptoms, warm transfer to triage line    Current symptom or concern: Ear feels plugged and has liquid coming out of his ear    Symptoms have been present for:  1 day(s)    Has patient previously been seen for this? Yes    By : Dr. Ramesh    Date: 09/20/22    Are there any new or worsening symptoms? Yes: Pt states that he has brown liquid coming out of his left ear. He states there is some pain with it as well. Please call to discuss. Thank you      Action Taken: Message routed to:  [facility] ENT    Travel Screening: Not Applicable

## 2022-10-06 NOTE — TELEPHONE ENCOUNTER
This writer returned call to patient regarding his symptoms of concern with the drainage from his ears.    Advised patient that unfortunately without seeing him in clinic that there wouldn't be much that we could do at this time. Patient was advised that provider is currently out. Advised patient that he should follow up with his PCP or urgent care to have the situation evaluated to ensure that there should be something additional done.    Patient states he was hoping to get some antibiotics. Advised patient that we wouldn't be able to accommodate that at this time. Patient appreciative of phone call. Patient knows to call back if he has additional questions or concerns. Patient stated that he will attempt to contact his PCP.    Patient verbalizes understanding of this plan and is in agreement. Patient has no further questions at this time.    TRUONG LAU LPN on 10/6/2022 at 11:43 AM

## 2022-10-10 ENCOUNTER — OFFICE VISIT (OUTPATIENT)
Dept: FAMILY MEDICINE | Facility: CLINIC | Age: 72
End: 2022-10-10
Payer: COMMERCIAL

## 2022-10-10 VITALS
TEMPERATURE: 98.6 F | SYSTOLIC BLOOD PRESSURE: 125 MMHG | BODY MASS INDEX: 26.51 KG/M2 | DIASTOLIC BLOOD PRESSURE: 73 MMHG | WEIGHT: 200 LBS | OXYGEN SATURATION: 98 % | HEART RATE: 71 BPM | HEIGHT: 73 IN

## 2022-10-10 DIAGNOSIS — H60.92 OTITIS EXTERNA OF LEFT EAR, UNSPECIFIED CHRONICITY, UNSPECIFIED TYPE: Primary | ICD-10-CM

## 2022-10-10 PROCEDURE — 99213 OFFICE O/P EST LOW 20 MIN: CPT | Performed by: FAMILY MEDICINE

## 2022-10-10 RX ORDER — OFLOXACIN 3 MG/ML
5 SOLUTION AURICULAR (OTIC) DAILY
Qty: 5 ML | Refills: 0 | Status: SHIPPED | OUTPATIENT
Start: 2022-10-10 | End: 2023-04-14

## 2022-10-14 NOTE — PROGRESS NOTES
"  Otolaryngology Clinic    Name: Renan Rossi  MRN: [id number]  Age: 72 year old  : 1950  10/18/2022      Chief Complaint:   Follow up     History of Present Illness:   Renan Rossi is a 72 year old male with a history of parotid mass and left PE tube who presents for follow up. At his last visit on 22 he had noted a right sided postauricular lump with right sided ear pain and jaw pain. He tells me today that the lump on his right side has improved but his left ear is bothering him. He started to feel plugged sensation at the beginning of the month and was started on some drops. He was also experiencing some otorrhea and gurgling sound which was worse in the mornings. The plugged sensation has mostly improved but he feels he has decreased hearing out of this ear. He has been using Flonase once a day. He denies dizziness.       Review of Systems:   Pertinent items are noted in HPI or as in patient entered ROS below, remainder of complete ROS is negative.    ENT ROS 12/3/2019   Neurology: Numbness   Cardiopulmonary: Cough   Musculoskeletal: Sore or stiff joints        Physical Exam:   /70 (BP Location: Right arm, Patient Position: Sitting, Cuff Size: Adult Regular)   Pulse 70   Ht 1.854 m (6' 1\")   Wt 92.5 kg (204 lb)   SpO2 96%   BMI 26.91 kg/m       PHYSICAL EXAMINATION:    Constitutional:  The patient was unaccompanied, well-groomed, and in no acute distress.    Skin:  Warm and pink.    Neurologic:  Alert and oriented x 3.  CN's III-XII within normal limits.  Voice normal.   Psychiatric:  The patient's affect was calm, cooperative, and appropriate.    Respiratory:  Breathing comfortably without stridor or exertion of accessory muscles.    Eyes: Extraocular movement intact.    Head:  Normocephalic and atraumatic.  No lesions or scars.    Ears:  Pinnae and tragus non-tender.  EAC's and TM's were clear.  Fresh drops in left ear. Left TM appears thickened.   Nose:  Sinuses were non-tender.  " Anterior rhinoscopy revealed midline septum and absence of purulence or polyps.    OC/OP:  Normal tongue, floor of mouth, buccal mucosa, and palate.  No lesions or masses on inspection or palpation.  No abnormal lymph tissue in the oropharynx. Neck:  Supple with normal laryngeal and tracheal landmarks.  The right tail of parotid gland has small mass essentially unchanged from last exam. No palpable thyroid.  Lymphatic:  There is no palpable lymphadenopathy in the neck.     Assessment and Plan:  No diagnosis found.  Renan Rossi is a 72 year old male with a history of parotid mass and left PE tube who presents for follow up. His left ear is overall well appearing but I suspect that he may have had an ear infection that was cleared with use of ear drops and there may be residual fluid. I recommend he obtain an audiogram which he already has scheduled the same day as our next follow-up. If he has fluid in his ear we will plan to place an ear tube at our next follow-up. He should finish his course of ear drops.     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     Follow-up: No follow-ups on file.         Scribe Disclosure:  I, Genet Brown, am serving as a scribe to document services personally performed by Doron Ramesh MD at this visit, based upon the provider's statements to me. All documentation has been reviewed by the aforementioned provider prior to being entered into the official medical record.

## 2022-10-18 ENCOUNTER — OFFICE VISIT (OUTPATIENT)
Dept: OTOLARYNGOLOGY | Facility: CLINIC | Age: 72
End: 2022-10-18
Payer: COMMERCIAL

## 2022-10-18 VITALS
HEART RATE: 70 BPM | DIASTOLIC BLOOD PRESSURE: 70 MMHG | WEIGHT: 204 LBS | OXYGEN SATURATION: 96 % | BODY MASS INDEX: 27.04 KG/M2 | HEIGHT: 73 IN | SYSTOLIC BLOOD PRESSURE: 122 MMHG

## 2022-10-18 DIAGNOSIS — K11.8 PAROTID MASS: Primary | ICD-10-CM

## 2022-10-18 PROCEDURE — 99213 OFFICE O/P EST LOW 20 MIN: CPT | Performed by: OTOLARYNGOLOGY

## 2022-10-18 ASSESSMENT — PAIN SCALES - GENERAL: PAINLEVEL: NO PAIN (0)

## 2022-10-18 NOTE — LETTER
"10/18/2022       RE: Renan Rossi  [address] [place] [phone]     Dear Colleague,    Thank you for referring your patient, Renan Rossi, to the [facility] EAR NOSE AND THROAT CLINIC [place] at [facility]. Please see a copy of my visit note below.      Otolaryngology Clinic    Name: Renan Rossi  MRN: [id number]  Age: 72 year old  : 1950  10/18/2022      Chief Complaint:   Follow up     History of Present Illness:   Renan Rossi is a 72 year old male with a history of parotid mass and left PE tube who presents for follow up. At his last visit on 22 he had noted a right sided postauricular lump with right sided ear pain and jaw pain. He tells me today that the lump on his right side has improved but his left ear is bothering him. He started to feel plugged sensation at the beginning of the month and was started on some drops. He was also experiencing some otorrhea and gurgling sound which was worse in the mornings. The plugged sensation has mostly improved but he feels he has decreased hearing out of this ear. He has been using Flonase once a day. He denies dizziness.       Review of Systems:   Pertinent items are noted in HPI or as in patient entered ROS below, remainder of complete ROS is negative.    ENT ROS 12/3/2019   Neurology: Numbness   Cardiopulmonary: Cough   Musculoskeletal: Sore or stiff joints        Physical Exam:   /70 (BP Location: Right arm, Patient Position: Sitting, Cuff Size: Adult Regular)   Pulse 70   Ht 1.854 m (6' 1\")   Wt 92.5 kg (204 lb)   SpO2 96%   BMI 26.91 kg/m       PHYSICAL EXAMINATION:    Constitutional:  The patient was unaccompanied, well-groomed, and in no acute distress.    Skin:  Warm and pink.    Neurologic:  Alert and oriented x 3.  CN's III-XII within normal limits.  Voice normal.   Psychiatric:  The patient's affect was calm, cooperative, and appropriate.    Respiratory:  Breathing " comfortably without stridor or exertion of accessory muscles.    Eyes: Extraocular movement intact.    Head:  Normocephalic and atraumatic.  No lesions or scars.    Ears:  Pinnae and tragus non-tender.  EAC's and TM's were clear.  Fresh drops in left ear. Left TM appears thickened.   Nose:  Sinuses were non-tender.  Anterior rhinoscopy revealed midline septum and absence of purulence or polyps.    OC/OP:  Normal tongue, floor of mouth, buccal mucosa, and palate.  No lesions or masses on inspection or palpation.  No abnormal lymph tissue in the oropharynx. Neck:  Supple with normal laryngeal and tracheal landmarks.  The right tail of parotid gland has small mass essentially unchanged from last exam. No palpable thyroid.  Lymphatic:  There is no palpable lymphadenopathy in the neck.     Assessment and Plan:  No diagnosis found.  Renan Rossi is a 72 year old male with a history of parotid mass and left PE tube who presents for follow up. His left ear is overall well appearing but I suspect that he may have had an ear infection that was cleared with use of ear drops and there may be residual fluid. I recommend he obtain an audiogram which he already has scheduled the same day as our next follow-up. If he has fluid in his ear we will plan to place an ear tube at our next follow-up. He should finish his course of ear drops.     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     Follow-up: No follow-ups on file.        Scribe Disclosure:  I, Genet Brown, am serving as a scribe to document services personally performed by Doron Ramesh MD at this visit, based upon the provider's statements to me. All documentation has been reviewed by the aforementioned provider prior to being entered into the official medical record.          Again, thank you for allowing me to participate in the  care of your patient.      Sincerely,    Doron Ramesh MD

## 2022-10-18 NOTE — NURSING NOTE
"Chief Complaint   Patient presents with     RECHECK     3-4 week follow up       Blood pressure 122/70, pulse 70, height 1.854 m (6' 1\"), weight 92.5 kg (204 lb), SpO2 96 %.    Lilly Nuno, EMT    "

## 2022-11-21 NOTE — PROGRESS NOTES
"  Otolaryngology Clinic    Name: Renan Rossi  MRN: [id number]  Age: 72 year old  : 2022      Chief Complaint:   Follow up     History of Present Illness:   Renan Rossi is a 72 year old male with a history of parotid mass and left PE tube who presents for follow up. At his last visit on 10/18/2022, I suspected that there may be residual fluid in his left ear after resolved ear infection. I recommended he obtain audiogram and that we can place an ear tube at today's follow-up if needed.    Today, he reports that he has been hearing some popping in his left ear, but it no longer feels plugged. It pops several times per day, and he is interested in having a tube placed. He has been using Flonase, but he does not think that it has been helpful. His hearing and balance have improved.     Review of Systems:   Pertinent items are noted in HPI or as in patient entered ROS below, remainder of complete ROS is negative.    ENT ROS 12/3/2019   Neurology: Numbness   Cardiopulmonary: Cough   Musculoskeletal: Sore or stiff joints        Physical Exam:   /69 (BP Location: Left arm, Patient Position: Sitting, Cuff Size: Adult Regular)   Pulse 70   Ht 1.854 m (6' 1\")   Wt 92.9 kg (204 lb 11.2 oz)   SpO2 95%   BMI 27.01 kg/m       PHYSICAL EXAMINATION:    Constitutional:  The patient was unaccompanied, well-groomed, and in no acute distress.    Skin:  Warm and pink.    Neurologic:  Alert and oriented x 3.  CN's III-XII within normal limits.  Voice normal.   Psychiatric:  The patient's affect was calm, cooperative, and appropriate.    Respiratory:  Breathing comfortably without stridor or exertion of accessory muscles.    Eyes: Extraocular movement intact.    Head:  Normocephalic and atraumatic.  No lesions or scars.    Ears:  Pinnae and tragus non-tender.  EAC's and TM's were clear.  OC/OP:  Normal tongue, floor of mouth, buccal mucosa, and palate.  No lesions or masses on inspection or palpation.  No " abnormal lymph tissue in the oropharynx.  The pterygoid region is non-tender.       Assessment and Plan:  Renan Rossi is a 72 year old male with a history of parotid mass and left PE tube who presents for follow up. He has eustachian tube dysfunction. His left ear no longer feels plugged, and his hearing and balance have improved, but he is still experiencing bothersome popping. We will work to schedule him for tube placement in the next couple weeks.     Scribe Disclosure:  I, Ilir Ware, am serving as a scribe to document services personally performed by Doron Ramesh MD at this visit, based upon the provider's statements to me. All documentation has been reviewed by the aforementioned provider prior to being entered into the official medical record.

## 2022-11-22 ENCOUNTER — OFFICE VISIT (OUTPATIENT)
Dept: AUDIOLOGY | Facility: CLINIC | Age: 72
End: 2022-11-22
Attending: OTOLARYNGOLOGY
Payer: COMMERCIAL

## 2022-11-22 ENCOUNTER — OFFICE VISIT (OUTPATIENT)
Dept: OTOLARYNGOLOGY | Facility: CLINIC | Age: 72
End: 2022-11-22
Payer: COMMERCIAL

## 2022-11-22 VITALS
HEIGHT: 73 IN | DIASTOLIC BLOOD PRESSURE: 69 MMHG | HEART RATE: 70 BPM | BODY MASS INDEX: 27.13 KG/M2 | OXYGEN SATURATION: 95 % | SYSTOLIC BLOOD PRESSURE: 113 MMHG | WEIGHT: 204.7 LBS

## 2022-11-22 DIAGNOSIS — H69.90 DYSFUNCTION OF EUSTACHIAN TUBE, UNSPECIFIED LATERALITY: ICD-10-CM

## 2022-11-22 DIAGNOSIS — H90.A32 MIXED CONDUCTIVE AND SENSORINEURAL HEARING LOSS OF LEFT EAR WITH RESTRICTED HEARING OF RIGHT EAR: Primary | ICD-10-CM

## 2022-11-22 DIAGNOSIS — H90.A21 SENSORINEURAL HEARING LOSS (SNHL) OF RIGHT EAR WITH RESTRICTED HEARING OF LEFT EAR: ICD-10-CM

## 2022-11-22 DIAGNOSIS — H69.90 DYSFUNCTION OF EUSTACHIAN TUBE, UNSPECIFIED LATERALITY: Primary | ICD-10-CM

## 2022-11-22 DIAGNOSIS — H61.21 IMPACTED CERUMEN OF RIGHT EAR: ICD-10-CM

## 2022-11-22 PROCEDURE — 92550 TYMPANOMETRY & REFLEX THRESH: CPT | Performed by: AUDIOLOGIST

## 2022-11-22 PROCEDURE — 92565 STENGER TEST PURE TONE: CPT | Performed by: AUDIOLOGIST

## 2022-11-22 PROCEDURE — 92557 COMPREHENSIVE HEARING TEST: CPT | Performed by: AUDIOLOGIST

## 2022-11-22 PROCEDURE — 99213 OFFICE O/P EST LOW 20 MIN: CPT | Performed by: OTOLARYNGOLOGY

## 2022-11-22 ASSESSMENT — PAIN SCALES - GENERAL: PAINLEVEL: NO PAIN (0)

## 2022-11-22 NOTE — PROGRESS NOTES
AUDIOLOGY REPORT    SUMMARY: Audiology visit completed. See audiogram for results.      RECOMMENDATIONS: Follow-up with ENT.      Rafa Ludwig.  Licensed Audiologist  [place] #[id number]

## 2022-11-22 NOTE — LETTER
"2022       RE: Renan Rossi  [address] [place] [phone]     Dear Colleague,    Thank you for referring your patient, Renan Rossi, to the [facility] EAR NOSE AND THROAT CLINIC [place] at [facility]. Please see a copy of my visit note below.      Otolaryngology Clinic    Name: Renan Rossi  MRN: [id number]  Age: 72 year old  : 2022      Chief Complaint:   Follow up     History of Present Illness:   Renan Rossi is a 72 year old male with a history of parotid mass and left PE tube who presents for follow up. At his last visit on 10/18/2022, I suspected that there may be residual fluid in his left ear after resolved ear infection. I recommended he obtain audiogram and that we can place an ear tube at today's follow-up if needed.    Today, he reports that he has been hearing some popping in his left ear, but it no longer feels plugged. It pops several times per day, and he is interested in having a tube placed. He has been using Flonase, but he does not think that it has been helpful. His hearing and balance have improved.     Review of Systems:   Pertinent items are noted in HPI or as in patient entered ROS below, remainder of complete ROS is negative.    ENT ROS 12/3/2019   Neurology: Numbness   Cardiopulmonary: Cough   Musculoskeletal: Sore or stiff joints        Physical Exam:   /69 (BP Location: Left arm, Patient Position: Sitting, Cuff Size: Adult Regular)   Pulse 70   Ht 1.854 m (6' 1\")   Wt 92.9 kg (204 lb 11.2 oz)   SpO2 95%   BMI 27.01 kg/m       PHYSICAL EXAMINATION:    Constitutional:  The patient was unaccompanied, well-groomed, and in no acute distress.    Skin:  Warm and pink.    Neurologic:  Alert and oriented x 3.  CN's III-XII within normal limits.  Voice normal.   Psychiatric:  The patient's affect was calm, cooperative, and appropriate.    Respiratory:  Breathing comfortably without stridor " or exertion of accessory muscles.    Eyes: Extraocular movement intact.    Head:  Normocephalic and atraumatic.  No lesions or scars.    Ears:  Pinnae and tragus non-tender.  EAC's and TM's were clear.  OC/OP:  Normal tongue, floor of mouth, buccal mucosa, and palate.  No lesions or masses on inspection or palpation.  No abnormal lymph tissue in the oropharynx.  The pterygoid region is non-tender.       Assessment and Plan:  Renan Rossi is a 72 year old male with a history of parotid mass and left PE tube who presents for follow up. He has eustachian tube dysfunction. His left ear no longer feels plugged, and his hearing and balance have improved, but he is still experiencing bothersome popping. We will work to schedule him for tube placement in the next couple weeks.     Scribe Disclosure:  I, Ilir Ware, am serving as a scribe to document services personally performed by Doron Ramesh MD at this visit, based upon the provider's statements to me. All documentation has been reviewed by the aforementioned provider prior to being entered into the official medical record.      Again, thank you for allowing me to participate in the care of your patient.      Sincerely,    Doron Ramesh MD

## 2022-11-22 NOTE — PATIENT INSTRUCTIONS
"You were seen in the clinic today by Dr. Ramesh. If you have any questions or concerns after your appointment, please call the clinic at [phone]. Press \"1\" for scheduling, press \"3\" for nurse advice.    2.   The following has been recommended for you based upon your appointment today:     -Plan to return the clinic in approximately 2 weeks for PE Tube placement.    Perlita RICKS, RN  [facility]  Department of Otolaryngology  [phone]      "

## 2022-12-05 NOTE — PROGRESS NOTES
"  Otolaryngology Clinic    Name: Renan Rossi  MRN: [id number]  Age: 72 year old  : 2022      Chief Complaint:   Follow up     History of Present Illness:   Renan Rossi is a 72 year old male with a history of parotid mass, eustachian tube dysfunction and who presents for follow up. At our last visit on 2022, we planned to schedule him for PE tube placement for his eustachian tube dysfunction.    Today, he presents for PE tube placement.     Review of Systems:   Pertinent items are noted in HPI or as in patient entered ROS below, remainder of complete ROS is negative.    ENT ROS 12/3/2019   Neurology: Numbness   Cardiopulmonary: Cough   Musculoskeletal: Sore or stiff joints        Physical Exam:   /81 (BP Location: Right arm, Patient Position: Sitting, Cuff Size: Adult Regular)   Pulse 82   Ht 1.854 m (6' 1\")   Wt 93.4 kg (206 lb)   BMI 27.18 kg/m       PHYSICAL EXAMINATION:    Constitutional:  The patient was unaccompanied, well-groomed, and in no acute distress.    Skin:  Warm and pink.    Neurologic:  Alert and oriented x 3.  CN's III-XII within normal limits.  Voice normal.   Psychiatric:  The patient's affect was calm, cooperative, and appropriate.    Respiratory:  Breathing comfortably without stridor or exertion of accessory muscles.    Eyes: Extraocular movement intact.    Head:  Normocephalic and atraumatic.  No lesions or scars.    Ears:  Pinnae and tragus non-tender.  EAC's and TM's were clear.  Red TM, fluid suctioned from middle ear.  Neck:  Supple with normal laryngeal and tracheal landmarks.  The parotid beds were without masses.  No palpable thyroid.  Lymphatic:  There is no palpable lymphadenopathy in the neck.      Procedure:  Informed consent was obtained. The left ear was examined under the microscope.  Phenol was placed on the anterior quadrant.  Myringotomy incision was made. Patient became dizzy and procedure was aborted.    Assessment and Plan:  Renan Rossi " is a 72 year old male with history of parotid mass and eustachian tube dysfunction. He presents today for tube placement. I attempted to perform PE tube placement, but patient became dizzy after myringotomy, and procedure was aborted. I instructed him to start Floxin drops and return for tube placement in 2 weeks.     Scribe Disclosure:  I, Ilir Ware, am serving as a scribe to document services personally performed by Doron Ramesh MD at this visit, based upon the provider's statements to me. All documentation has been reviewed by the aforementioned provider prior to being entered into the official medical record.

## 2022-12-06 ENCOUNTER — OFFICE VISIT (OUTPATIENT)
Dept: OTOLARYNGOLOGY | Facility: CLINIC | Age: 72
End: 2022-12-06
Payer: COMMERCIAL

## 2022-12-06 VITALS
DIASTOLIC BLOOD PRESSURE: 81 MMHG | HEIGHT: 73 IN | WEIGHT: 206 LBS | SYSTOLIC BLOOD PRESSURE: 124 MMHG | HEART RATE: 82 BPM | BODY MASS INDEX: 27.3 KG/M2

## 2022-12-06 DIAGNOSIS — H66.90 EAR INFECTION: Primary | ICD-10-CM

## 2022-12-06 PROCEDURE — 69420 INCISION OF EARDRUM: CPT | Mod: 53 | Performed by: OTOLARYNGOLOGY

## 2022-12-06 RX ORDER — OFLOXACIN 3 MG/ML
5 SOLUTION AURICULAR (OTIC) 2 TIMES DAILY
Qty: 5 ML | Refills: 0 | Status: SHIPPED | OUTPATIENT
Start: 2022-12-06 | End: 2023-09-13

## 2022-12-06 ASSESSMENT — PAIN SCALES - GENERAL: PAINLEVEL: NO PAIN (0)

## 2022-12-06 NOTE — LETTER
"2022       RE: Renan Rossi  [address] [place] [id number]     Dear Colleague,    Thank you for referring your patient, Renan Rossi, to the [facility] EAR NOSE AND THROAT CLINIC [place] at [facility]. Please see a copy of my visit note below.      Otolaryngology Clinic    Name: Renan Rossi  MRN: [id number]  Age: 72 year old  : 2022      Chief Complaint:   Follow up     History of Present Illness:   Renan Rossi is a 72 year old male with a history of parotid mass, eustachian tube dysfunction and who presents for follow up. At our last visit on 2022, we planned to schedule him for PE tube placement for his eustachian tube dysfunction.    Today, he presents for PE tube placement.     Review of Systems:   Pertinent items are noted in HPI or as in patient entered ROS below, remainder of complete ROS is negative.    ENT ROS 12/3/2019   Neurology: Numbness   Cardiopulmonary: Cough   Musculoskeletal: Sore or stiff joints        Physical Exam:   /81 (BP Location: Right arm, Patient Position: Sitting, Cuff Size: Adult Regular)   Pulse 82   Ht 1.854 m (6' 1\")   Wt 93.4 kg (206 lb)   BMI 27.18 kg/m       PHYSICAL EXAMINATION:    Constitutional:  The patient was unaccompanied, well-groomed, and in no acute distress.    Skin:  Warm and pink.    Neurologic:  Alert and oriented x 3.  CN's III-XII within normal limits.  Voice normal.   Psychiatric:  The patient's affect was calm, cooperative, and appropriate.    Respiratory:  Breathing comfortably without stridor or exertion of accessory muscles.    Eyes: Extraocular movement intact.    Head:  Normocephalic and atraumatic.  No lesions or scars.    Ears:  Pinnae and tragus non-tender.  EAC's and TM's were clear.  Red TM, fluid suctioned from middle ear.  Neck:  Supple with normal laryngeal and tracheal landmarks.  The parotid beds were without masses.  No palpable " thyroid.  Lymphatic:  There is no palpable lymphadenopathy in the neck.      Procedure:  Informed consent was obtained. The left ear was examined under the microscope.  Phenol was placed on the anterior quadrant.  Myringotomy incision was made. Patient became dizzy and procedure was aborted.    Assessment and Plan:  Renan Rossi is a 72 year old male with history of parotid mass and eustachian tube dysfunction. He presents today for tube placement. I attempted to perform PE tube placement, but patient became dizzy after myringotomy, and procedure was aborted. I instructed him to start Floxin drops and return for tube placement in 2 weeks.     Scribe Disclosure:  I, Ilir Ware, am serving as a scribe to document services personally performed by Doron Ramesh MD at this visit, based upon the provider's statements to me. All documentation has been reviewed by the aforementioned provider prior to being entered into the official medical record.         Again, thank you for allowing me to participate in the care of your patient.      Sincerely,    Doron Ramesh MD

## 2022-12-06 NOTE — PATIENT INSTRUCTIONS
"You were seen in the clinic today by Dr. Ramesh. If you have any questions or concerns after your appointment, please call the clinic at [phone]. Press \"1\" for scheduling, press \"3\" for nurse advice.    2.   The following has been recommended for you based upon your appointment today:   -Floxin drops have been prescribed to treat a middle ear infection.       3.   Plan to return the clinic in two weeks for tube placement once you have completed your Floxin as prescribed.    Perlita RICKS, RN  [facility]  Department of Otolaryngology  [phone]      "

## 2022-12-06 NOTE — NURSING NOTE
"Chief Complaint   Patient presents with     RECHECK     2 week follow up       Blood pressure 124/81, pulse 82, height 1.854 m (6' 1\"), weight 93.4 kg (206 lb).    Lilly Nuno, EMT    "

## 2022-12-20 ENCOUNTER — OFFICE VISIT (OUTPATIENT)
Dept: OTOLARYNGOLOGY | Facility: CLINIC | Age: 72
End: 2022-12-20
Payer: COMMERCIAL

## 2022-12-20 VITALS
HEART RATE: 85 BPM | OXYGEN SATURATION: 98 % | RESPIRATION RATE: 18 BRPM | DIASTOLIC BLOOD PRESSURE: 80 MMHG | SYSTOLIC BLOOD PRESSURE: 142 MMHG

## 2022-12-20 DIAGNOSIS — H66.90 EAR INFECTION: Primary | ICD-10-CM

## 2022-12-20 PROCEDURE — 99213 OFFICE O/P EST LOW 20 MIN: CPT | Performed by: OTOLARYNGOLOGY

## 2022-12-20 RX ORDER — METHYLPREDNISOLONE 16 MG/1
16 TABLET ORAL DAILY
Qty: 3 TABLET | Refills: 0 | Status: SHIPPED | OUTPATIENT
Start: 2022-12-20 | End: 2022-12-26

## 2022-12-20 ASSESSMENT — PAIN SCALES - GENERAL: PAINLEVEL: NO PAIN (0)

## 2022-12-20 NOTE — PATIENT INSTRUCTIONS
"You were seen in the clinic today by Dr. Ramesh. If you have any questions or concerns after your appointment, please call the clinic at [phone]. Press \"1\" for scheduling, press \"3\" for nurse advice.    2.   The following has been recommended for you based upon your appointment today:   -CT temporal bone      Perlita RICKS, RN  [facility]  Department of Otolaryngology  [phone]     "
No

## 2022-12-20 NOTE — LETTER
2022       RE: Renan Rossi  [address] [place] [id number]     Dear Colleague,    Thank you for referring your patient, Renan Rossi, to the [facility] EAR NOSE AND THROAT CLINIC [place] at [facility]. Please see a copy of my visit note below.      Otolaryngology Clinic    Name: Renan Rossi  MRN: [id number]  Age: 72 year old  : 2022      Chief Complaint:   Follow up     History of Present Illness:   Renan Rossi is a 72 year old male with a history of parotid mass and eustachian tube dysfunction who presents for follow up. At our last visit on 22, PE tube placement was attempted, but patient became dizzy, so procedure was ultimately aborted.    Today, patient presents for PE tube placement. He reports that he has been doing fine since the myringotomy incision was made.      Review of Systems:   Pertinent items are noted in HPI or as in patient entered ROS below, remainder of complete ROS is negative.    ENT ROS 12/3/2019   Neurology: Numbness   Cardiopulmonary: Cough   Musculoskeletal: Sore or stiff joints        Physical Exam:   BP (!) 142/80 (BP Location: Left arm)   Pulse 85   Resp 18   SpO2 98%      PHYSICAL EXAMINATION:    Constitutional:  The patient was unaccompanied, well-groomed, and in no acute distress.    Skin:  Warm and pink.    Neurologic:  Alert and oriented x 3.  CN's III-XII within normal limits.  Voice normal.   Psychiatric:  The patient's affect was calm, cooperative, and appropriate.    Respiratory:  Breathing comfortably without stridor or exertion of accessory muscles.    Eyes: Extraocular movement intact.    Head:  Normocephalic and atraumatic.  No lesions or scars.    Ears:  Pinnae and tragus non-tender.  EAC's and TM's were clear. Posterior superior left TM is thickened, almost as though there is small mass behind it. Pinpoint perforation at posterior quadrant where myringotomy was  done.  Nose:  Sinuses were non-tender.  Anterior rhinoscopy revealed midline septum and absence of purulence or polyps.   OC/OP:  Normal tongue, floor of mouth, buccal mucosa, and palate.  No lesions or masses on inspection or palpation.  No abnormal lymph tissue in the oropharynx.  The pterygoid region is non-tender.  Neck:  Supple with normal laryngeal and tracheal landmarks.  The parotid beds were without masses.  No palpable thyroid.  Lymphatic:  There is no palpable lymphadenopathy in the neck.      Assessment and Plan:  Renan Rossi is a 72 year old male with a history of parotid mass and eustachian tube dysfunction who presents for follow up. PE tube placement is successful. On exam, left TM is thickened concerning for the presence of a mass behind it. As such, I would like the patient to get a CT temporal bones. He will follow up in 6 weeks via telephone visit with the results of his CT.     Scribe Disclosure:  I, Tammie Skinner, am serving as a scribe to document services personally performed by Doron Ramesh MD at this visit, based upon the provider's statements to me. All documentation has been reviewed by the aforementioned provider prior to being entered into the official medical record.           Again, thank you for allowing me to participate in the care of your patient.      Sincerely,    Doron Ramesh MD

## 2022-12-20 NOTE — PROGRESS NOTES
Otolaryngology Clinic    Name: Renan Rossi  MRN: [id number]  Age: 72 year old  : 2022      Chief Complaint:   Follow up     History of Present Illness:   Renan Rossi is a 72 year old male with a history of parotid mass and eustachian tube dysfunction who presents for follow up. At our last visit on 22, PE tube placement was attempted, but patient became dizzy, so procedure was ultimately aborted.    Today, patient presents for PE tube placement. He reports that he has been doing fine since the myringotomy incision was made.      Review of Systems:   Pertinent items are noted in HPI or as in patient entered ROS below, remainder of complete ROS is negative.    ENT ROS 12/3/2019   Neurology: Numbness   Cardiopulmonary: Cough   Musculoskeletal: Sore or stiff joints        Physical Exam:   BP (!) 142/80 (BP Location: Left arm)   Pulse 85   Resp 18   SpO2 98%      PHYSICAL EXAMINATION:    Constitutional:  The patient was unaccompanied, well-groomed, and in no acute distress.    Skin:  Warm and pink.    Neurologic:  Alert and oriented x 3.  CN's III-XII within normal limits.  Voice normal.   Psychiatric:  The patient's affect was calm, cooperative, and appropriate.    Respiratory:  Breathing comfortably without stridor or exertion of accessory muscles.    Eyes: Extraocular movement intact.    Head:  Normocephalic and atraumatic.  No lesions or scars.    Ears:  Pinnae and tragus non-tender.  EAC's and TM's were clear. Posterior superior left TM is thickened, almost as though there is small mass behind it. Pinpoint perforation at posterior quadrant where myringotomy was done.  Nose:  Sinuses were non-tender.  Anterior rhinoscopy revealed midline septum and absence of purulence or polyps.   OC/OP:  Normal tongue, floor of mouth, buccal mucosa, and palate.  No lesions or masses on inspection or palpation.  No abnormal lymph tissue in the oropharynx.  The pterygoid region is non-tender.  Neck:   Supple with normal laryngeal and tracheal landmarks.  The parotid beds were without masses.  No palpable thyroid.  Lymphatic:  There is no palpable lymphadenopathy in the neck.      Assessment and Plan:  Renan Rossi is a 72 year old male with a history of parotid mass and eustachian tube dysfunction who presents for follow up. PE tube placement is successful. On exam, left TM is thickened concerning for the presence of a mass behind it. As such, I would like the patient to get a CT temporal bones. He will follow up in 6 weeks via telephone visit with the results of his CT.     Scribe Disclosure:  I, Tammie Skinner, am serving as a scribe to document services personally performed by Doron Ramesh MD at this visit, based upon the provider's statements to me. All documentation has been reviewed by the aforementioned provider prior to being entered into the official medical record.

## 2022-12-26 RX ORDER — METHYLPREDNISOLONE 16 MG/1
16 TABLET ORAL DAILY
Qty: 4 TABLET | Refills: 0 | Status: SHIPPED | OUTPATIENT
Start: 2022-12-26 | End: 2023-04-14

## 2022-12-26 NOTE — PROGRESS NOTES
Received phone call from CT control regarding patient's prednisone and incorrect dosing ordered. Patient should have received 4 tablets of 16 mg each and only received 3 tablets. Original order cancelled by this writer and new order placed for 4 tablets of 16 mg. CT requesting that prescription be sent to our pharmacy on site for the patient. CT has stated that scan has been reordered for patient as prednisone was wrong dosing as well as patient had not taken medication prior to the scan as instructed.    Signed Prescriptions:                        Disp   Refills    methylPREDNISolone (MEDROL) 16 MG tablet   4 tabl*0        Sig: Take 1 tablet (16 mg) by mouth daily 12 hours prior           to the procedure with IV contrast  Authorizing Provider: TERRY CLARK    Nothing further needed from this writer at this time.    TRUONG LAU, LPN on 12/26/2022 at 8:07 AM

## 2022-12-27 ENCOUNTER — ANCILLARY PROCEDURE (OUTPATIENT)
Dept: CT IMAGING | Facility: CLINIC | Age: 72
End: 2022-12-27
Attending: OTOLARYNGOLOGY
Payer: COMMERCIAL

## 2022-12-27 LAB
CREAT BLD-MCNC: 1.1 MG/DL (ref 0.7–1.3)
GFR SERPL CREATININE-BSD FRML MDRD: >60 ML/MIN/1.73M2

## 2022-12-27 PROCEDURE — 70481 CT ORBIT/EAR/FOSSA W/DYE: CPT | Mod: GC | Performed by: RADIOLOGY

## 2022-12-27 PROCEDURE — 82565 ASSAY OF CREATININE: CPT | Performed by: PATHOLOGY

## 2022-12-27 RX ORDER — IOPAMIDOL 755 MG/ML
67 INJECTION, SOLUTION INTRAVASCULAR ONCE
Status: COMPLETED | OUTPATIENT
Start: 2022-12-27 | End: 2022-12-27

## 2022-12-27 RX ADMIN — IOPAMIDOL 67 ML: 755 INJECTION, SOLUTION INTRAVASCULAR at 08:28

## 2023-01-01 NOTE — PROGRESS NOTES
Assessment & Plan       ICD-10-CM    1. Otitis externa of left ear, unspecified chronicity, unspecified type  H60.92 ofloxacin (FLOXIN) 0.3 % otic solution        He appears to have a left external otitis, bacterial versus fungal  It is unclear whether there is still a residual TM perforation  He will likely need his left ear canal suctioned out next week when he sees ENT, but I will prescribe ofloxacin eardrops to be used in the meantime      Return in about 8 days (around 10/18/2022) for Follow-up with your ENT provider as scheduled.    Bakari Potter MD  [facility] MARY Urr[place]ia is a 72 year old, presenting for the following health issues:  Ear Problem      History of Present Illness       Reason for visit:  Left ear blocked  Symptom onset:  3-7 days ago  Symptoms include:  Water in ear  Symptom intensity:  Mild  Symptom progression:  Staying the same  Had these symptoms before:  No  What makes it worse:  No  What makes it better:  No    He eats 2-3 servings of fruits and vegetables daily.He consumes 0 sweetened beverage(s) daily.He exercises with enough effort to increase his heart rate 9 or less minutes per day.  He exercises with enough effort to increase his heart rate 3 or less days per week.   He is taking medications regularly.     He has had chronic ear problems, including having had a left PE tube in the last couple of years.  When he was last seen by ENT this past May the PE tube had extruded and there was a small residual perforation.  Last week his left ear was feeling plugged and he put a Q-tip in there and pulled out some wax.  His hearing is somewhat better now, but it still feels plugged.  It is not painful.  He has gotten a small amount of liquid drainage out of the ear in recent days, however.  He tried to get an appointment with his ENT provider, but they were booked until October 18.  He has an appointment to see ENT at that time.    Patient Active Problem List  "  Diagnosis     Advanced directives, counseling/discussion     Parotid mass     Current Outpatient Medications   Medication         fluticasone (FLONASE) 50 MCG/ACT nasal spray     No current facility-administered medications for this visit.         Review of Systems   Noncontributory except as above.      Objective    /73 (BP Location: Right arm, Patient Position: Sitting, Cuff Size: Adult Regular)   Pulse 71   Temp 98.6  F (37  C) (Oral)   Ht 1.854 m (6' 1\")   Wt 90.7 kg (200 lb)   SpO2 98%   BMI 26.39 kg/m    Body mass index is 26.39 kg/m .  Physical Exam   GENERAL: healthy, alert and no distress  HENT: His right ear canal is clear except for a small amount of cerumen.  The TM looks healthy.  The left external ear canal is clear except for a fair amount of grayish/whitish colored moist liquid debris deep in the ear canal against the TM.  The TM is poorly seen because of this material and I am not able to see whether there is any residual TM perforation or not.  There is no PE tube present currently.  He is nontender when pressing over the left tragus and when pulling on the left ear.                " Infant Carrier

## 2023-01-05 ENCOUNTER — TELEPHONE (OUTPATIENT)
Dept: OTOLARYNGOLOGY | Facility: CLINIC | Age: 73
End: 2023-01-05

## 2023-03-17 ENCOUNTER — DOCUMENTATION ONLY (OUTPATIENT)
Dept: LAB | Facility: CLINIC | Age: 73
End: 2023-03-17
Payer: COMMERCIAL

## 2023-03-17 NOTE — PROGRESS NOTES
Renan CANALES Analivon has an upcoming lab appointment:    Future Appointments   Date Time Provider Department Center   3/23/2023  7:15 AM [facility]   3/28/2023  7:40 AM Bakari Potter MD [facility]     Patient is scheduled for the following lab(s):    There is no order available. Please review and place either future orders or HMPO (Review of Health Maintenance Protocol Orders), as appropriate.    Health Maintenance Due   Topic     ANNUAL REVIEW OF HM ORDERS      Jaclyn Rizzo

## 2023-03-17 NOTE — PROGRESS NOTES
I notice that patient has the lab appointment and then a physical with Tariq a few days later.       Please call patient to clarify this.  If he wants to see another provider for a physical that is fine but that provider could then put in desired labs.    If he wants to see me, go ahead and schedule with me ( could use an approval req slot etc if needed ) and then send back to me and I would put orders in.    Madhu José MD

## 2023-03-22 ENCOUNTER — DOCUMENTATION ONLY (OUTPATIENT)
Dept: LAB | Facility: CLINIC | Age: 73
End: 2023-03-22
Payer: COMMERCIAL

## 2023-03-22 DIAGNOSIS — R53.82 CHRONIC FATIGUE: ICD-10-CM

## 2023-03-22 DIAGNOSIS — E78.5 HYPERLIPIDEMIA LDL GOAL <100: Primary | ICD-10-CM

## 2023-03-22 DIAGNOSIS — Z12.5 SCREENING FOR PROSTATE CANCER: ICD-10-CM

## 2023-03-22 DIAGNOSIS — R73.01 IMPAIRED FASTING GLUCOSE: ICD-10-CM

## 2023-03-22 NOTE — PROGRESS NOTES
SECOND REQUEST!!!!    Please order future labs for 3-23 appointment @ 1842!    Thanks, Charito NOE

## 2023-03-23 ENCOUNTER — LAB (OUTPATIENT)
Dept: LAB | Facility: CLINIC | Age: 73
End: 2023-03-23
Payer: COMMERCIAL

## 2023-03-23 DIAGNOSIS — R53.82 CHRONIC FATIGUE: ICD-10-CM

## 2023-03-23 DIAGNOSIS — R73.01 IMPAIRED FASTING GLUCOSE: ICD-10-CM

## 2023-03-23 DIAGNOSIS — E78.5 HYPERLIPIDEMIA LDL GOAL <100: ICD-10-CM

## 2023-03-23 DIAGNOSIS — Z12.5 SCREENING FOR PROSTATE CANCER: ICD-10-CM

## 2023-03-23 LAB
ALBUMIN SERPL-MCNC: 3.6 G/DL (ref 3.4–5)
ALP SERPL-CCNC: 91 U/L (ref 40–150)
ALT SERPL W P-5'-P-CCNC: 26 U/L (ref 0–70)
ANION GAP SERPL CALCULATED.3IONS-SCNC: 5 MMOL/L (ref 3–14)
AST SERPL W P-5'-P-CCNC: 18 U/L (ref 0–45)
BASOPHILS # BLD AUTO: 0 10E3/UL (ref 0–0.2)
BASOPHILS NFR BLD AUTO: 0 %
BILIRUB SERPL-MCNC: 0.5 MG/DL (ref 0.2–1.3)
BUN SERPL-MCNC: 16 MG/DL (ref 7–30)
CALCIUM SERPL-MCNC: 9.1 MG/DL (ref 8.5–10.1)
CHLORIDE BLD-SCNC: 105 MMOL/L (ref 94–109)
CHOLEST SERPL-MCNC: 177 MG/DL
CO2 SERPL-SCNC: 27 MMOL/L (ref 20–32)
CREAT SERPL-MCNC: 1.38 MG/DL (ref 0.66–1.25)
EOSINOPHIL # BLD AUTO: 0.1 10E3/UL (ref 0–0.7)
EOSINOPHIL NFR BLD AUTO: 2 %
ERYTHROCYTE [DISTWIDTH] IN BLOOD BY AUTOMATED COUNT: 13.7 % (ref 10–15)
FASTING STATUS PATIENT QL REPORTED: YES
GFR SERPL CREATININE-BSD FRML MDRD: 54 ML/MIN/1.73M2
GLUCOSE BLD-MCNC: 113 MG/DL (ref 70–99)
HBA1C MFR BLD: 6 % (ref 0–5.6)
HCT VFR BLD AUTO: 46.7 % (ref 40–53)
HDLC SERPL-MCNC: 43 MG/DL
HGB BLD-MCNC: 15.7 G/DL (ref 13.3–17.7)
LDLC SERPL CALC-MCNC: 106 MG/DL
LYMPHOCYTES # BLD AUTO: 2.2 10E3/UL (ref 0.8–5.3)
LYMPHOCYTES NFR BLD AUTO: 33 %
MCH RBC QN AUTO: 29 PG (ref 26.5–33)
MCHC RBC AUTO-ENTMCNC: 33.6 G/DL (ref 31.5–36.5)
MCV RBC AUTO: 86 FL (ref 78–100)
MONOCYTES # BLD AUTO: 0.5 10E3/UL (ref 0–1.3)
MONOCYTES NFR BLD AUTO: 8 %
NEUTROPHILS # BLD AUTO: 3.8 10E3/UL (ref 1.6–8.3)
NEUTROPHILS NFR BLD AUTO: 57 %
NONHDLC SERPL-MCNC: 134 MG/DL
PLATELET # BLD AUTO: 241 10E3/UL (ref 150–450)
POTASSIUM BLD-SCNC: 4.7 MMOL/L (ref 3.4–5.3)
PROT SERPL-MCNC: 7.4 G/DL (ref 6.8–8.8)
PSA SERPL-MCNC: 6.44 UG/L (ref 0–4)
RBC # BLD AUTO: 5.41 10E6/UL (ref 4.4–5.9)
SODIUM SERPL-SCNC: 137 MMOL/L (ref 133–144)
TRIGL SERPL-MCNC: 141 MG/DL
TSH SERPL DL<=0.005 MIU/L-ACNC: 0.85 MU/L (ref 0.4–4)
WBC # BLD AUTO: 6.6 10E3/UL (ref 4–11)

## 2023-03-23 PROCEDURE — 80061 LIPID PANEL: CPT

## 2023-03-23 PROCEDURE — 85025 COMPLETE CBC W/AUTO DIFF WBC: CPT

## 2023-03-23 PROCEDURE — 84443 ASSAY THYROID STIM HORMONE: CPT

## 2023-03-23 PROCEDURE — 83036 HEMOGLOBIN GLYCOSYLATED A1C: CPT

## 2023-03-23 PROCEDURE — 36415 COLL VENOUS BLD VENIPUNCTURE: CPT

## 2023-03-23 PROCEDURE — 80053 COMPREHEN METABOLIC PANEL: CPT

## 2023-03-23 PROCEDURE — G0103 PSA SCREENING: HCPCS

## 2023-03-23 NOTE — PROGRESS NOTES
Spoke to patient. He is aware that his appointment is with Dr. Potter. He only scheduled that appointment due to availability. When he asked for lab orders he was under the impression that the message would've been sent to Dr. Potter and not Dr.Deal Combs-

## 2023-03-23 NOTE — PROGRESS NOTES
Team- Please call patient and ask if he is aware that he is scheduled with Dr. Bakari Potter for his annual appointment rather than Dr. Madhu José (his PCP). See note below form Dr. Madhu José.    Thanks,  RAMBO Montenegro RN  [facility]

## 2023-03-23 NOTE — PROGRESS NOTES
I did put in future orders    However please see message from last week    I had advised that we call patient to clarify who he wishes to see for his physical.  In past he has seen me but he is scheduled with Tariq.  Thus I had sent a message last week inquiring that if that is the case he should get the labs put in by Tariq.  However since apparently that did not happen and he is getting labs tomorrow I went ahead and put in the orders.  Still would be worthwhile to clarify with patient whom he wants to see.  Whatever patient wants is fine.    Madhu José MD

## 2023-04-14 ENCOUNTER — OFFICE VISIT (OUTPATIENT)
Dept: FAMILY MEDICINE | Facility: CLINIC | Age: 73
End: 2023-04-14
Payer: COMMERCIAL

## 2023-04-14 VITALS
OXYGEN SATURATION: 98 % | TEMPERATURE: 97.1 F | SYSTOLIC BLOOD PRESSURE: 129 MMHG | BODY MASS INDEX: 28.6 KG/M2 | DIASTOLIC BLOOD PRESSURE: 81 MMHG | HEART RATE: 71 BPM | HEIGHT: 73 IN | WEIGHT: 215.8 LBS

## 2023-04-14 DIAGNOSIS — R79.89 ELEVATED SERUM CREATININE: ICD-10-CM

## 2023-04-14 DIAGNOSIS — Z00.00 ROUTINE GENERAL MEDICAL EXAMINATION AT A HEALTH CARE FACILITY: Primary | ICD-10-CM

## 2023-04-14 DIAGNOSIS — Z00.00 ENCOUNTER FOR MEDICARE ANNUAL WELLNESS EXAM: ICD-10-CM

## 2023-04-14 PROCEDURE — G0439 PPPS, SUBSEQ VISIT: HCPCS | Performed by: FAMILY MEDICINE

## 2023-04-14 ASSESSMENT — ENCOUNTER SYMPTOMS
HEMATURIA: 0
CHILLS: 0
COUGH: 0
ABDOMINAL PAIN: 0
CONSTIPATION: 0
HEMATOCHEZIA: 0

## 2023-04-14 ASSESSMENT — ACTIVITIES OF DAILY LIVING (ADL): CURRENT_FUNCTION: NO ASSISTANCE NEEDED

## 2023-04-14 NOTE — PROGRESS NOTES
"SUBJECTIVE:   Renan is a 73 year old who presents for Preventive Visit.      4/14/2023     7:12 AM   Additional Questions   Roomed by casimiro      Patient has been advised of split billing requirements and indicates understanding: Yes  Are you in the first 12 months of your Medicare coverage?  No    Healthy Habits:     In general, how would you rate your overall health?  Good    Frequency of exercise:  2-3 days/week    Duration of exercise:  Less than 15 minutes    Do you usually eat at least 4 servings of fruit and vegetables a day, include whole grains    & fiber and avoid regularly eating high fat or \"junk\" foods?  No    Taking medications regularly:  Not Applicable    Medication side effects:  Not applicable    Ability to successfully perform activities of daily living:  No assistance needed    Home Safety:  No safety concerns identified    Hearing Impairment:  Feel that people are mumbling or not speaking clearly    In the past 6 months, have you been bothered by leaking of urine?  No    In general, how would you rate your overall mental or emotional health?  Good      PHQ-2 Total Score: 0    Additional concerns today:  No      Have you ever done Advance Care Planning? (For example, a Health Directive, POLST, or a discussion with a medical provider or your loved ones about your wishes): Yes, patient states has an Advance Care Planning document and will bring a copy to the clinic.       Fall risk  Fallen 2 or more times in the past year?: No  Any fall with injury in the past year?: No    Cognitive Screening   1) Repeat 3 items (Leader, Season, Table)     2) Clock draw:   NORMAL  3) 3 item recall:   Recalls 1 object   Results: NORMAL clock, 1-2 items recalled: COGNITIVE IMPAIRMENT LESS LIKELY    Mini-CogTM Copyright ALEXANDER Jimenez. Licensed by the author for use in [facility]; reprinted with permission (priya@.[email or web address]). All rights reserved.      Do you have sleep apnea, excessive snoring or daytime drowsiness?: " no    Reviewed and updated as needed this visit by clinical staff   Tobacco  Allergies  Meds              Reviewed and updated as needed this visit by Provider                 Social History     Tobacco Use     Smoking status: Former     Types: Cigarettes     Quit date: 7/15/2012     Years since quitting: 10.7     Smokeless tobacco: Never   Vaping Use     Vaping status: Never Used   Substance Use Topics     Alcohol use: No              4/14/2023     6:54 AM   Alcohol Use   Prescreen: >3 drinks/day or >7 drinks/week? Not Applicable     Do you have a current opioid prescription? No  Do you use any other controlled substances or medications that are not prescribed by a provider? None                Current providers sharing in care for this patient include:    Patient Care Team:  Madhu José MD as PCP - General  Doron Ramesh MD as MD (Otolaryngology)  Doron Ramesh MD as Assigned Surgical Provider  Madhu José MD as Assigned PCP  Lilly Ragland PA-C as Assigned Sleep Provider    The following health maintenance items are reviewed in Epic and correct as of today:  Health Maintenance   Topic Date Due     COVID-19 Vaccine (1) Never done     ZOSTER IMMUNIZATION (1 of 2) Never done     LUNG CANCER SCREENING  Never done     MEDICARE ANNUAL WELLNESS VISIT  Never done     AORTIC ANEURYSM SCREENING (SYSTEM ASSIGNED)  Never done     ADVANCE CARE PLANNING  02/02/2021     DTAP/TDAP/TD IMMUNIZATION (2 - Td or Tdap) 08/06/2022     INFLUENZA VACCINE (1) 09/01/2022     ANNUAL REVIEW OF HM ORDERS  12/22/2022     FALL RISK ASSESSMENT  04/14/2024     COLORECTAL CANCER SCREENING  01/07/2025     LIPID  03/23/2028     HEPATITIS C SCREENING  Completed     PHQ-2 (once per calendar year)  Completed     Pneumococcal Vaccine: 65+ Years  Completed     IPV IMMUNIZATION  Aged Out     MENINGITIS IMMUNIZATION  Aged Out                 Review of Systems   Constitutional: Negative for chills.   HENT: Positive  "for congestion.    Respiratory: Negative for cough.    Cardiovascular: Negative for chest pain.   Gastrointestinal: Negative for abdominal pain, constipation and hematochezia.   Genitourinary: Negative for hematuria.      congestion mild     Overall feeling well    On flonase    On vitamins    Retired Nov 2021    Involved in politics    2 kids, healthy    Some exercise    Has some real estate    No physical limitations    6 hours of sleep    Takes melatonin 5 mg         OBJECTIVE:   /81 (BP Location: Right arm, Patient Position: Sitting, Cuff Size: Adult Regular)   Pulse 71   Temp 97.1  F (36.2  C) (Temporal)   Ht 1.842 m (6' 0.5\")   Wt 97.9 kg (215 lb 12.8 oz)   SpO2 98%   BMI 28.87 kg/m   Estimated body mass index is 28.87 kg/m  as calculated from the following:    Height as of this encounter: 1.842 m (6' 0.5\").    Weight as of this encounter: 97.9 kg (215 lb 12.8 oz).  Physical Exam  GENERAL: healthy, alert and no distress  EYES: Eyes grossly normal to inspection, PERRL and conjunctivae and sclerae normal  HENT: ear canals and TM's normal, nose and mouth without ulcers or lesions  NECK: no adenopathy, no asymmetry, masses, or scars and thyroid normal to palpation  RESP: lungs clear to auscultation - no rales, rhonchi or wheezes  CV: regular rate and rhythm, normal S1 S2, no S3 or S4, no murmur, click or rub, no peripheral edema and peripheral pulses strong  ABDOMEN: soft, nontender, no hepatosplenomegaly, no masses and bowel sounds normal  MS: no gross musculoskeletal defects noted, no edema  SKIN: no suspicious lesions or rashes  NEURO: Normal strength and tone, mentation intact and speech normal  PSYCH: mentation appears normal, affect normal/bright  Varicose vein on right, nontender      Diagnostic Test Results:  Labs reviewed in Epic    ASSESSMENT / PLAN:   Renan was seen today for wellness visit.    Diagnoses and all orders for this visit:    Routine general medical examination at [facility]" "facility    Elevated serum creatinine  -     Basic metabolic panel; Future    discussed multiple issues with patient  Went over recent labs in detail  psa high but better  Creat mildly elevated.  Advised him to avoid ibuprofen type meds and do lab only appointment in 6 months to recheck this.    Come in sooner if needed based on symptoms  Keep working on healthy diet/exercise and wt loss  He could get tdap and / or shingrix at pharmacy, discussed  No  Prescription meds needed     Patient has been advised of split billing requirements and indicates understanding: Yes      COUNSELING:  Reviewed preventive health counseling, as reflected in patient instructions       Regular exercise       Healthy diet/nutrition       Vision screening       Hearing screening       Dental care       Colon cancer screening       Prostate cancer screening      BMI:   Estimated body mass index is 28.87 kg/m  as calculated from the following:    Height as of this encounter: 1.842 m (6' 0.5\").    Weight as of this encounter: 97.9 kg (215 lb 12.8 oz).   Weight management plan: Discussed healthy diet and exercise guidelines      He reports that he quit smoking about 10 years ago. His smoking use included cigarettes. He has never used smokeless tobacco.      Appropriate preventive services were discussed with this patient, including applicable screening as appropriate for cardiovascular disease, diabetes, osteopenia/osteoporosis, and glaucoma.  As appropriate for age/gender, discussed screening for colorectal cancer, prostate cancer, breast cancer, and cervical cancer. Checklist reviewing preventive services available has been given to the patient.    Reviewed patients plan of care and provided an AVS. The Basic Care Plan (routine screening as documented in Health Maintenance) for Renan meets the Care Plan requirement. This Care Plan has been established and reviewed with the Patient.       Madhu José MD  [facility] " [place]    Identified Health Risks:      The patient was counseled and encouraged to consider modifying their diet and eating habits. He was provided with information on recommended healthy diet options.  The patient was provided with written information regarding signs of hearing loss.

## 2023-04-14 NOTE — PATIENT INSTRUCTIONS
Keep working on healthy diet/exercise and weight loss    Overall labs look okay but advise a lab only appointment in about 6 months to recheck kidney test    Stay well hydrated      Patient Education   Personalized Prevention Plan  You are due for the preventive services outlined below.  Your care team is available to assist you in scheduling these services.  If you have already completed any of these items, please share that information with your care team to update in your medical record.  Health Maintenance Due   Topic Date Due     COVID-19 Vaccine (1) Never done     Zoster (Shingles) Vaccine (1 of 2) Never done     LUNG CANCER SCREENING  Never done     AORTIC ANEURYSM SCREENING (SYSTEM ASSIGNED)  Never done     Diptheria Tetanus Pertussis (DTAP/TDAP/TD) Vaccine (2 - Td or Tdap) 08/06/2022     Flu Vaccine (1) 09/01/2022     ANNUAL REVIEW OF HM ORDERS  12/22/2022       Understanding USDA MyPlate  The USDA has guidelines to help you make healthy food choices. These are called MyPlate. MyPlate shows the food groups that make up healthy meals using the image of a place setting. Before you eat, think about the healthiest choices for what to put on your plate or in your cup or bowl. To learn more about building a healthy plate, visit www.choosemyplate.gov.     The food groups    Fruits. Any fruit or 100% fruit juice counts as part of the Fruit Group. Fruits may be fresh, canned, frozen, or dried, and may be whole, cut-up, or pureed. Make 1/2 of your plate fruits and vegetables.    Vegetables. Any vegetable or 100% vegetable juice counts as a member of the Vegetable Group. Vegetables may be fresh, frozen, canned, or dried. They can be served raw or cooked and may be whole, cut-up, or mashed. Make 1/2 of your plate fruits and vegetables.    Grains. All foods made from grains are part of the Grains Group. These include wheat, rice, oats, cornmeal, and barley. Grains are often used to make foods such as bread, pasta,  oatmeal, cereal, tortillas, and grits. Grains should be no more than 1/4 of your plate. At least half of your grains should be whole grains.    Protein. This group includes meat, poultry, seafood, beans and peas, eggs, processed soy products (such as tofu), nuts (including nut butters), and seeds. Make protein choices no more than 1/4 of your plate. Meat and poultry choices should be lean or low fat.    Dairy. The Dairy Group includes all fluid milk products and foods made from milk that contain calcium, such as yogurt and cheese. (Foods that have little calcium, such as cream, butter, and cream cheese, are not part of this group.) Most dairy choices should be low-fat or fat-free.    Oils. Oils aren't a food group, but they do contain essential nutrients. However it's important to watch your intake of oils. These are fats that are liquid at room temperature. They include canola, corn, olive, soybean, vegetable, and sunflower oil. Foods that are mainly oil include mayonnaise, certain salad dressings, and soft margarines. You likely already get your daily oil allowance from the foods you eat.  Things to limit  Eating healthy also means limiting these things in your diet:    Salt (sodium). Many processed foods have a lot of sodium. To keep sodium intake down, eat fresh vegetables, meats, poultry, and seafood when possible. Purchase low-sodium, reduced-sodium, or no-salt-added food products at the store. And don't add salt to your meals at home. Instead, season them with herbs and spices such as dill, oregano, cumin, and paprika. Or try adding flavor with lemon or lime zest and juice.    Saturated fat. Saturated fats are most often found in animal products such as beef, pork, and chicken. They are often solid at room temperature, such as butter. To reduce your saturated fat intake, choose leaner cuts of meat and poultry. And try healthier cooking methods such as grilling, broiling, roasting, or baking. For a simple  lower-fat swap, use plain nonfat yogurt instead of mayonnaise when making potato salad or macaroni salad.    Added sugars. These are sugars added to foods. They are in foods such as ice cream, candy, soda, fruit drinks, sports drinks, energy drinks, cookies, pastries, jams, and syrups. Cut down on added sugars by sharing sweet treats with a family member or friend. You can also choose fruit for dessert, and drink water or other unsweetened beverages.  [employer] last reviewed this educational content on 6/1/2020 2000-2022 [employer]. All rights reserved. This information is not intended as a substitute for professional medical care. Always follow your healthcare professional's instructions.          Signs of Hearing Loss  Hearing loss is a problem shared by many people. In fact, it's one of the most common health problems, particularly as people age. Most people aged 65 and older have some hearing loss. By age 80, almost everyone does. Hearing loss often occurs slowly over the years. So, you may not realize your hearing has gotten worse.   When sudden hearing loss occurs, it's important to contact your healthcare provider right away. Your provider will do a medical exam and a hearing exam as soon as possible. This is to help find the cause and type of your sudden hearing loss. Based on your diagnosis, your healthcare provider will discuss possible treatments.      Hearing much better with one ear can be a sign of hearing loss.     Have your hearing checked  Call your healthcare provider if you:     Have to strain to hear normal conversation    Have to watch other people s faces very carefully to follow what they re saying    Need to ask people to repeat what they ve said    Often misunderstand what people are saying    Turn the volume of the television or radio up so high that others complain    Feel that people are mumbling when they re talking to you    Find that the effort to hear leaves you  feeling tired and irritated    Notice, when using the phone, that you hear better with one ear than the other  [employer] last reviewed this educational content on 6/1/2022 2000-2022 [employer]. All rights reserved. This information is not intended as a substitute for professional medical care. Always follow your healthcare professional's instructions.

## 2023-05-24 ENCOUNTER — OFFICE VISIT (OUTPATIENT)
Dept: URGENT CARE | Facility: URGENT CARE | Age: 73
End: 2023-05-24
Payer: COMMERCIAL

## 2023-05-24 VITALS
TEMPERATURE: 97.2 F | OXYGEN SATURATION: 97 % | SYSTOLIC BLOOD PRESSURE: 130 MMHG | DIASTOLIC BLOOD PRESSURE: 77 MMHG | BODY MASS INDEX: 28.5 KG/M2 | WEIGHT: 213.1 LBS | HEART RATE: 84 BPM

## 2023-05-24 DIAGNOSIS — H10.021 PINK EYE, RIGHT: Primary | ICD-10-CM

## 2023-05-24 PROCEDURE — 99213 OFFICE O/P EST LOW 20 MIN: CPT | Performed by: NURSE PRACTITIONER

## 2023-05-24 RX ORDER — POLYMYXIN B SULFATE AND TRIMETHOPRIM 1; 10000 MG/ML; [USP'U]/ML
1-2 SOLUTION OPHTHALMIC EVERY 6 HOURS
Qty: 5 ML | Refills: 0 | Status: SHIPPED | OUTPATIENT
Start: 2023-05-24 | End: 2023-05-31

## 2023-05-24 NOTE — PROGRESS NOTES
SUBJECTIVE:  Chief Complaint: Right eye redness    History of Present Illness:  Renan Rossi is a 73 year old male who presents complaining of moderate right eye redness for 2 day(s).   Onset/timing: sudden.    Treatment measures tried include: none  Contact wearer : No   Feels like a film over the eye  No pain, foreign body scratch       Past Medical History:   Diagnosis Date     Hypercholesteremia      Tobacco abuse      Current Outpatient Medications   Medication Sig Dispense Refill     fluticasone (FLONASE) 50 MCG/ACT nasal spray Spray 2 sprays into both nostrils daily 16 g 11        ROS:  Review of systems negative except as stated above.    OBJECTIVE:  /77 (BP Location: Left arm, Patient Position: Sitting, Cuff Size: Adult Large)   Pulse 84   Temp 97.2  F (36.2  C) (Tympanic)   Wt 96.7 kg (213 lb 1.6 oz)   SpO2 97%   BMI 28.50 kg/m    General: no acute distress  Eye exam: left eye normal lid, conjunctiva, cornea, pupil and fundus, right eye abnormal findings: conjunctivitis with erythema.  Heart: NORMAL - regular rate and rhythm without murmur.  Lungs: normal and clear to auscultation    ASSESSMENT:  (H10.021) Pink eye, right  (primary encounter diagnosis)    Plan: trimethoprim-polymyxin b (POLYTRIM) 68416-7.1 UNIT/ML-% ophthalmic solution  Warm packs for comfort. Hygiene measures discussed.  Home treat and monitor sx call if new or worsening      CLARISA Francisco CNP

## 2023-06-22 ENCOUNTER — TRANSFERRED RECORDS (OUTPATIENT)
Dept: HEALTH INFORMATION MANAGEMENT | Facility: CLINIC | Age: 73
End: 2023-06-22
Payer: COMMERCIAL

## 2023-09-05 ENCOUNTER — OFFICE VISIT (OUTPATIENT)
Dept: FAMILY MEDICINE | Facility: CLINIC | Age: 73
End: 2023-09-05
Payer: COMMERCIAL

## 2023-09-05 VITALS
SYSTOLIC BLOOD PRESSURE: 135 MMHG | OXYGEN SATURATION: 95 % | WEIGHT: 209 LBS | DIASTOLIC BLOOD PRESSURE: 79 MMHG | HEIGHT: 72 IN | TEMPERATURE: 97.7 F | BODY MASS INDEX: 28.31 KG/M2 | RESPIRATION RATE: 16 BRPM | HEART RATE: 82 BPM

## 2023-09-05 DIAGNOSIS — H26.9 CATARACT OF BOTH EYES, UNSPECIFIED CATARACT TYPE: ICD-10-CM

## 2023-09-05 DIAGNOSIS — Z01.818 PREOP GENERAL PHYSICAL EXAM: Primary | ICD-10-CM

## 2023-09-05 PROCEDURE — 99214 OFFICE O/P EST MOD 30 MIN: CPT | Performed by: FAMILY MEDICINE

## 2023-09-05 NOTE — PROGRESS NOTES
[address]  [place] [address]  Phone: [phone]  Primary Provider: Balwinder Da Silva  Pre-op Performing Provider: BALWINDER DA SILVA        PREOPERATIVE EVALUATION:  Today's date: 9/5/2023    Renan Rossi is a 73 year old male who presents for a preoperative evaluation.    Surgical Information:  Surgery/Procedure: Cataract left eye then right   Surgery Location: [facility] in [place]  Surgeon: Dr. Duke  Surgery Date: 9/11/23 and 9/27/23  Time of Surgery:   Where patient plans to recover: At home with family  Fax number for surgical facility:         Subjective       HPI related to upcoming procedure: 73 year old male with bilateral cataracts. Affecting vision.        9/5/2023     7:42 AM   Preop Questions   1. Have you ever had a heart attack or stroke? No   2. Have you ever had surgery on your heart or blood vessels, such as a stent placement, a coronary artery bypass, or surgery on an artery in your head, neck, heart, or legs? No   3. Do you have chest pain with activity? No   4. Do you have a history of  heart failure? No   5. Do you currently have a cold, bronchitis or symptoms of other infection? No   6. Do you have a cough, shortness of breath, or wheezing? No   7. Do you or anyone in your family have previous history of blood clots? No   8. Do you or does anyone in your family have a serious bleeding problem such as prolonged bleeding following surgeries or cuts? No   9. Have you ever had problems with anemia or been told to take iron pills? No   10. Have you had any abnormal blood loss such as black, tarry or bloody stools? No   11. Have you ever had a blood transfusion? No   12. Are you willing to have a blood transfusion if it is medically needed before, during, or after your surgery? Yes   13. Have you or any of your relatives ever had problems with anesthesia? No   14. Do you have sleep apnea, excessive snoring or daytime drowsiness? No   15. Do  you have any artifical heart valves or other implanted medical devices like a pacemaker, defibrillator, or continuous glucose monitor? No   16. Do you have artificial joints? No   17. Are you allergic to latex? No       Health Care Directive:  Patient does have a Health Care Directive or Living Will:     Preoperative Review of :  Patient not on any controlled  substance           Review of Systems  Constitutional, neuro, ENT, endocrine, pulmonary, cardiac, gastrointestinal, genitourinary, musculoskeletal, integument and psychiatric systems are negative, except as otherwise noted.    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  Renan CANALES Analivon has no plan or code status on file. Discussed available resources and provided with information. Confirmed code status reflects current choices pending further ACP discussions.  Confirmed/documented designated decision maker(s). See permanent comments section of demographics in clinical tab.   Added by Linsey Lr on 7/1/2013  Discussed advance care planning with patient; however, patient declined at this time. 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    PAROTIDECTOMY Right 12/16/2019    Procedure: Right Parotid surgery;  Surgeon:  Doron Ramesh MD;  Location:  OR    [facility] NONSPECIFIC PROCEDURE  1975    right kidney, blockage     Current Outpatient Medications   Medication Sig Dispense Refill    fluticasone (FLONASE) 50 MCG/ACT nasal spray Spray 2 sprays into both nostrils daily 16 g 11       Allergies   Allergen Reactions    Contrast Dye Rash     pt had a rash from iv contrast 25years ago        Social History     Tobacco Use    Smoking status: Former     Types: Cigarettes     Quit date: 7/15/2012     Years since quittin.1    Smokeless tobacco: Never   Substance Use Topics    Alcohol use: No        History   Drug Use No         Objective     There were no vitals taken for this visit.    Physical Exam    GENERAL APPEARANCE: healthy, alert and no distress     EYES: EOMI, PERRL     HENT: ear canals and TM's normal and nose and mouth without ulcers or lesions     NECK: no adenopathy, no asymmetry, masses, or scars and thyroid normal to palpation     RESP: lungs clear to auscultation - no rales, rhonchi or wheezes     CV: regular rates and rhythm, normal S1 S2, no S3 or S4 and no murmur, click or rub     ABDOMEN:  soft, nontender, no HSM or masses and bowel sounds normal     MS: extremities normal- no gross deformities noted, no evidence of inflammation in joints, FROM in all extremities.     SKIN: no suspicious lesions or rashes     NEURO: Normal strength and tone, sensory exam grossly normal, mentation intact and speech normal     PSYCH: mentation appears normal. and affect normal/bright     LYMPHATICS: No cervical adenopathy    Recent Labs   Lab Test 23  0711 22  0826 22  0752   HGB 15.7  --  13.5     --  247     --  141   POTASSIUM 4.7  --  4.3   CR 1.38* 1.1 1.07   A1C 6.0*  --  6.2*        Diagnostics:      No EKG needed for cataract procedure    See labs from earlier this year       ASSESSMENT / PLAN:  (Z01.818) Preop general physical exam  (primary encounter diagnosis)  Comment: patient in stable  general health  Plan: okay for cataract procedure    (H26.9) Cataract of both eyes, unspecified cataract type  Comment: as above   Plan: as above           I reviewed the patient's medications, allergies, medical history, family history, and social history.    Madhu José MD        Revised Cardiac Risk Index (RCRI):  The patient has the following serious cardiovascular risks for perioperative complications:   - No serious cardiac risks = 0 points     RCRI Interpretation: 0 points: Class I (very low risk - 0.4% complication rate)         Signed Electronically by: Madhu José MD  Copy of this evaluation report is provided to requesting physician.

## 2023-09-05 NOTE — PATIENT INSTRUCTIONS
Look at preop instructions from [facility]    Okay for procedure    Call with problems/  questions

## 2023-09-13 DIAGNOSIS — H66.90 EAR INFECTION: ICD-10-CM

## 2023-09-13 RX ORDER — OFLOXACIN 3 MG/ML
5 SOLUTION AURICULAR (OTIC) 2 TIMES DAILY
Qty: 5 ML | Refills: 0 | Status: SHIPPED | OUTPATIENT
Start: 2023-09-13

## 2024-03-15 ENCOUNTER — PATIENT OUTREACH (OUTPATIENT)
Dept: CARE COORDINATION | Facility: CLINIC | Age: 74
End: 2024-03-15
Payer: COMMERCIAL

## 2024-03-29 ENCOUNTER — PATIENT OUTREACH (OUTPATIENT)
Dept: CARE COORDINATION | Facility: CLINIC | Age: 74
End: 2024-03-29
Payer: COMMERCIAL

## 2024-08-05 ENCOUNTER — TELEPHONE (OUTPATIENT)
Dept: FAMILY MEDICINE | Facility: CLINIC | Age: 74
End: 2024-08-05
Payer: COMMERCIAL

## 2024-08-05 NOTE — TELEPHONE ENCOUNTER
Patient Quality Outreach    Patient is due for the following:   Physical Annual Wellness Visit    Next Steps:   Schedule a Annual Wellness Visit    Type of outreach:    Sent letter.    Next Steps:  Reach out within 90 days via Phone.    Max number of attempts reached: Yes. Will try again in 90 days if patient still on fail list.    Questions for provider review:    None           Linsey Lr, [facility]  Chart routed to chart closed.       Adult

## 2024-08-05 NOTE — LETTER
August 5, 2024    To  Renan Rossi  [address] [place] [phone]    Your team at [facility] cares about your health. We have reviewed your chart and based on our findings; we are making the following recommendations to better manage your health.     You are in particular need of attention regarding the following:     PREVENTATIVE VISIT: Annual Medicare Wellness:Schedule an Annual Medicare Wellness Exam. Please call your [facility] clinic to set up your appointment.    If you have already completed these items, please contact the clinic via phone or   MyChart so your care team can review and update your records. Thank you for   choosing [facility] for your healthcare needs. For any questions,   concerns, or to schedule an appointment please contact our clinic.    Healthy Regards,      Your [facility] Care Team

## 2024-10-11 ENCOUNTER — PATIENT OUTREACH (OUTPATIENT)
Dept: CARE COORDINATION | Facility: CLINIC | Age: 74
End: 2024-10-11
Payer: COMMERCIAL

## 2025-01-15 ENCOUNTER — OFFICE VISIT (OUTPATIENT)
Dept: FAMILY MEDICINE | Facility: CLINIC | Age: 75
End: 2025-01-15
Payer: COMMERCIAL

## 2025-01-15 VITALS
SYSTOLIC BLOOD PRESSURE: 120 MMHG | RESPIRATION RATE: 16 BRPM | TEMPERATURE: 97.1 F | DIASTOLIC BLOOD PRESSURE: 80 MMHG | HEART RATE: 77 BPM | WEIGHT: 212 LBS | HEIGHT: 72 IN | BODY MASS INDEX: 28.71 KG/M2 | OXYGEN SATURATION: 96 %

## 2025-01-15 DIAGNOSIS — Z00.00 ENCOUNTER FOR MEDICARE ANNUAL WELLNESS EXAM: Primary | ICD-10-CM

## 2025-01-15 DIAGNOSIS — J30.9 ALLERGIC RHINITIS, UNSPECIFIED SEASONALITY, UNSPECIFIED TRIGGER: ICD-10-CM

## 2025-01-15 DIAGNOSIS — R73.01 IMPAIRED FASTING GLUCOSE: ICD-10-CM

## 2025-01-15 DIAGNOSIS — R53.83 FATIGUE, UNSPECIFIED TYPE: ICD-10-CM

## 2025-01-15 DIAGNOSIS — Z12.11 SCREEN FOR COLON CANCER: ICD-10-CM

## 2025-01-15 DIAGNOSIS — E78.5 HYPERLIPIDEMIA LDL GOAL <100: ICD-10-CM

## 2025-01-15 DIAGNOSIS — Z12.5 SCREENING FOR PROSTATE CANCER: ICD-10-CM

## 2025-01-15 LAB
BASOPHILS # BLD AUTO: 0 10E3/UL (ref 0–0.2)
BASOPHILS NFR BLD AUTO: 1 %
EOSINOPHIL # BLD AUTO: 0.1 10E3/UL (ref 0–0.7)
EOSINOPHIL NFR BLD AUTO: 1 %
ERYTHROCYTE [DISTWIDTH] IN BLOOD BY AUTOMATED COUNT: 13.6 % (ref 10–15)
EST. AVERAGE GLUCOSE BLD GHB EST-MCNC: 128 MG/DL
HBA1C MFR BLD: 6.1 % (ref 0–5.6)
HCT VFR BLD AUTO: 42.4 % (ref 40–53)
HGB BLD-MCNC: 14.4 G/DL (ref 13.3–17.7)
IMM GRANULOCYTES # BLD: 0 10E3/UL
IMM GRANULOCYTES NFR BLD: 0 %
LYMPHOCYTES # BLD AUTO: 2.2 10E3/UL (ref 0.8–5.3)
LYMPHOCYTES NFR BLD AUTO: 28 %
MCH RBC QN AUTO: 29 PG (ref 26.5–33)
MCHC RBC AUTO-ENTMCNC: 34 G/DL (ref 31.5–36.5)
MCV RBC AUTO: 86 FL (ref 78–100)
MONOCYTES # BLD AUTO: 0.6 10E3/UL (ref 0–1.3)
MONOCYTES NFR BLD AUTO: 8 %
NEUTROPHILS # BLD AUTO: 4.9 10E3/UL (ref 1.6–8.3)
NEUTROPHILS NFR BLD AUTO: 63 %
PLATELET # BLD AUTO: 252 10E3/UL (ref 150–450)
RBC # BLD AUTO: 4.96 10E6/UL (ref 4.4–5.9)
WBC # BLD AUTO: 7.8 10E3/UL (ref 4–11)

## 2025-01-15 PROCEDURE — 36415 COLL VENOUS BLD VENIPUNCTURE: CPT | Performed by: FAMILY MEDICINE

## 2025-01-15 PROCEDURE — 85025 COMPLETE CBC W/AUTO DIFF WBC: CPT | Performed by: FAMILY MEDICINE

## 2025-01-15 PROCEDURE — 84443 ASSAY THYROID STIM HORMONE: CPT | Performed by: FAMILY MEDICINE

## 2025-01-15 PROCEDURE — 80061 LIPID PANEL: CPT | Performed by: FAMILY MEDICINE

## 2025-01-15 PROCEDURE — 83036 HEMOGLOBIN GLYCOSYLATED A1C: CPT | Performed by: FAMILY MEDICINE

## 2025-01-15 PROCEDURE — G0103 PSA SCREENING: HCPCS | Performed by: FAMILY MEDICINE

## 2025-01-15 PROCEDURE — 80053 COMPREHEN METABOLIC PANEL: CPT | Performed by: FAMILY MEDICINE

## 2025-01-15 PROCEDURE — G0439 PPPS, SUBSEQ VISIT: HCPCS | Performed by: FAMILY MEDICINE

## 2025-01-15 RX ORDER — FLUTICASONE PROPIONATE 50 MCG
2 SPRAY, SUSPENSION (ML) NASAL DAILY
COMMUNITY
Start: 2025-01-15

## 2025-01-15 SDOH — HEALTH STABILITY: PHYSICAL HEALTH: ON AVERAGE, HOW MANY MINUTES DO YOU ENGAGE IN EXERCISE AT THIS LEVEL?: 20 MIN

## 2025-01-15 SDOH — HEALTH STABILITY: PHYSICAL HEALTH: ON AVERAGE, HOW MANY DAYS PER WEEK DO YOU ENGAGE IN MODERATE TO STRENUOUS EXERCISE (LIKE A BRISK WALK)?: 2 DAYS

## 2025-01-15 ASSESSMENT — SOCIAL DETERMINANTS OF HEALTH (SDOH): HOW OFTEN DO YOU GET TOGETHER WITH FRIENDS OR RELATIVES?: ONCE A WEEK

## 2025-01-15 ASSESSMENT — PAIN SCALES - GENERAL: PAINLEVEL_OUTOF10: NO PAIN (0)

## 2025-01-15 NOTE — PROGRESS NOTES
Preventive Care Visit  [facility] MARY José MD, Family Medicine  Franky 15, 2025          Ree Urrutia is a 74 year old, presenting for the following:  Wellness Visit        1/15/2025    11:40 AM   Additional Questions   Roomed by Linsey VELÁSQUEZ     No concerns from patient    Patient okay with weight     Built new deck this last summer    Chores around house and yard    Use olive oil     No fast food          Health Care Directive  Patient does not have a Health Care Directive: Patient states has Advance Directive and will bring in a copy to clinic.      1/15/2025   General Health   How would you rate your overall physical health? Good   Feel stress (tense, anxious, or unable to sleep) Not at all         1/15/2025   Nutrition   Diet: Regular (no restrictions)         1/15/2025   Exercise   Days per week of moderate/strenous exercise 2 days   Average minutes spent exercising at this level 20 min   (!) EXERCISE CONCERN      1/15/2025   Social Factors   Frequency of gathering with friends or relatives Once a week   Worry food won't last until get money to buy more No   Food not last or not have enough money for food? No   Do you have housing? (Housing is defined as stable permanent housing and does not include staying ouside in a car, in a tent, in an abandoned building, in an overnight shelter, or couch-surfing.) Yes   Are you worried about losing your housing? No   Lack of transportation? No   Unable to get utilities (heat,electricity)? No         1/15/2025   Fall Risk   Fallen 2 or more times in the past year? No   Trouble with walking or balance? No          1/15/2025   Activities of Daily Living- Home Safety   Needs help with the following daily activites None of the above   Safety concerns in the home None of the above         1/15/2025   Dental   Dentist two times every year? Yes         1/15/2025   Hearing Screening   Hearing concerns? None of the above          1/15/2025   Driving Risk Screening   Patient/family members have concerns about driving No         1/15/2025   General Alertness/Fatigue Screening   Have you been more tired than usual lately? No         1/15/2025   Urinary Incontinence Screening   Bothered by leaking urine in past 6 months No         1/15/2025   TB Screening   Were you born outside of the [place]? Yes         Today's PHQ-2 Score:       1/15/2025    11:46 AM   PHQ-2 (  [employer])   Q1: Little interest or pleasure in doing things 0   Q2: Feeling down, depressed or hopeless 0   PHQ-2 Score 0    Q1: Little interest or pleasure in doing things Not at all   Q2: Feeling down, depressed or hopeless Not at all   PHQ-2 Score 0       Patient-reported           1/15/2025   Substance Use   Alcohol more than 3/day or more than 7/wk No   Do you have a current opioid prescription? No   How severe/bad is pain from 1 to 10? 0/10 (No Pain)   Do you use any other substances recreationally? No     Social History     Tobacco Use    Smoking status: Former     Current packs/day: 0.00     Types: Cigarettes     Quit date: 7/15/2012     Years since quittin.5     Passive exposure: Never    Smokeless tobacco: Never   Vaping Use    Vaping status: Never Used   Substance Use Topics    Alcohol use: No    Drug use: No       ASCVD Risk   The 10-year ASCVD risk score (Mariela TRUJILLO, et al., 2019) is: 21.3%    Values used to calculate the score:      Age: 74 years      Sex: Male      Is Non- : No      Diabetic: No      Tobacco smoker: No      Systolic Blood Pressure: 120 mmHg      Is BP treated: No      HDL Cholesterol: 43 mg/dL      Total Cholesterol: 177 mg/dL            Reviewed and updated as needed this visit by Provider                      Current providers sharing in care for this patient include:  Patient Care Team:  Madhu José MD as PCP - General  Doron Ramesh MD as MD (Otolaryngology)  Madhu José MD as Assigned PCP    The  "following health maintenance items are reviewed in Epic and correct as of today:  Health Maintenance   Topic Date Due    ZOSTER IMMUNIZATION (1 of 2) Never done    LUNG CANCER SCREENING  Never done    AORTIC ANEURYSM SCREENING (SYSTEM ASSIGNED)  Never done    DTAP/TDAP/TD IMMUNIZATION (2 - Td or Tdap) 08/06/2022    ANNUAL REVIEW OF HM ORDERS  12/22/2022    MEDICARE ANNUAL WELLNESS VISIT  04/14/2024    INFLUENZA VACCINE (1) 09/01/2024    COVID-19 Vaccine (1 - 2024-25 season) Never done    COLORECTAL CANCER SCREENING  01/07/2025    RSV VACCINE (1 - 1-dose 75+ series) 02/02/2025    FALL RISK ASSESSMENT  01/15/2026    GLUCOSE  03/23/2026    LIPID  03/23/2028    ADVANCE CARE PLANNING  06/17/2029    HEPATITIS C SCREENING  Completed    PHQ-2 (once per calendar year)  Completed    Pneumococcal Vaccine: 50+ Years  Completed    HPV IMMUNIZATION  Aged Out    MENINGITIS IMMUNIZATION  Aged Out    RSV MONOCLONAL ANTIBODY  Aged Out       Sleep sporadic         Objective    Exam  /80 (BP Location: Right arm, Patient Position: Chair, Cuff Size: Adult Regular)   Pulse 77   Temp 97.1  F (36.2  C) (Temporal)   Resp 16   Ht 1.835 m (6' 0.25\")   Wt 96.2 kg (212 lb)   SpO2 96%   BMI 28.55 kg/m     Estimated body mass index is 28.55 kg/m  as calculated from the following:    Height as of this encounter: 1.835 m (6' 0.25\").    Weight as of this encounter: 96.2 kg (212 lb).    Physical Exam  GENERAL: alert and no distress  EYES: Eyes grossly normal to inspection, PERRL and conjunctivae and sclerae normal  HENT: normal cephalic/atraumatic, nose and mouth without ulcers or lesions, oropharynx clear, oral mucous membranes moist, and tympanic membranes only partially seen due to wax present  NECK: no adenopathy, no asymmetry, masses, or scars  RESP: lungs clear to auscultation - no rales, rhonchi or wheezes  CV: regular rate and rhythm, normal S1 S2, no S3 or S4, no murmur, click or rub, no peripheral edema  ABDOMEN: soft, " nontender, no hepatosplenomegaly, no masses and bowel sounds normal  MS: no gross musculoskeletal defects noted, no edema  SKIN: no suspicious lesions or rashes  NEURO: Normal strength and tone, mentation intact and speech normal  PSYCH: mentation appears normal, affect normal/bright        1/15/2025   Mini Cog   Clock Draw Score 2 Normal   3 Item Recall 3 objects recalled   Mini Cog Total Score 5              1. Encounter for Medicare annual wellness exam    2. Screen for colon cancer    3. Allergic rhinitis, unspecified seasonality, unspecified trigger    4. Hyperlipidemia LDL goal <100    5. Screening for prostate cancer    6. Impaired fasting glucose    7. Fatigue, unspecified type        Overall patient stable  He prefers not to take chol med  Encouraged increase exercise  Healthy diet  No prescriptions needed at this time  Encouraged patient to schedule colonoscopy           Signed Electronically by: Madhu José MD

## 2025-01-15 NOTE — PATIENT INSTRUCTIONS
Increase exercise    Advised scheduling colonoscopy     We will send you lab results              Patient Education   Preventive Care Advice   This is general advice given by our system to help you stay healthy. However, your care team may have specific advice just for you. Please talk to your care team about your preventive care needs.  Nutrition  Eat 5 or more servings of fruits and vegetables each day.  Try wheat bread, brown rice and whole grain pasta (instead of white bread, rice, and pasta).  Get enough calcium and vitamin D. Check the label on foods and aim for 100% of the RDA (recommended daily allowance).  Lifestyle  Exercise at least 150 minutes each week  (30 minutes a day, 5 days a week).  Do muscle strengthening activities 2 days a week. These help control your weight and prevent disease.  No smoking.  Wear sunscreen to prevent skin cancer.  Have a dental exam and cleaning every 6 months.  Yearly exams  See your health care team every year to talk about:  Any changes in your health.  Any medicines your care team has prescribed.  Preventive care, family planning, and ways to prevent chronic diseases.  Shots (vaccines)   HPV shots (up to age 26), if you've never had them before.  Hepatitis B shots (up to age 59), if you've never had them before.  COVID-19 shot: Get this shot when it's due.  Flu shot: Get a flu shot every year.  Tetanus shot: Get a tetanus shot every 10 years.  Pneumococcal, hepatitis A, and RSV shots: Ask your care team if you need these based on your risk.  Shingles shot (for age 50 and up)  General health tests  Diabetes screening:  Starting at age 35, Get screened for diabetes at least every 3 years.  If you are younger than age 35, ask your care team if you should be screened for diabetes.  Cholesterol test: At age 39, start having a cholesterol test every 5 years, or more often if advised.  Bone density scan (DEXA): At age 50, ask your care team if you should have this scan for  osteoporosis (brittle bones).  Hepatitis C: Get tested at least once in your life.  STIs (sexually transmitted infections)  Before age 24: Ask your care team if you should be screened for STIs.  After age 24: Get screened for STIs if you're at risk. You are at risk for STIs (including HIV) if:  You are sexually active with more than one person.  You don't use condoms every time.  You or a partner was diagnosed with a sexually transmitted infection.  If you are at risk for HIV, ask about PrEP medicine to prevent HIV.  Get tested for HIV at least once in your life, whether you are at risk for HIV or not.  Cancer screening tests  Cervical cancer screening: If you have a cervix, begin getting regular cervical cancer screening tests starting at age 21.  Breast cancer scan (mammogram): If you've ever had breasts, begin having regular mammograms starting at age 40. This is a scan to check for breast cancer.  Colon cancer screening: It is important to start screening for colon cancer at age 45.  Have a colonoscopy test every 10 years (or more often if you're at risk) Or, ask your provider about stool tests like a FIT test every year or Cologuard test every 3 years.  To learn more about your testing options, visit:   .  For help making a decision, visit:   https://bit.ly/at85292.  Prostate cancer screening test: If you have a prostate, ask your care team if a prostate cancer screening test (PSA) at age 55 is right for you.  Lung cancer screening: If you are a current or former smoker ages 50 to 80, ask your care team if ongoing lung cancer screenings are right for you.  For informational purposes only. Not to replace the advice of your health care provider. Copyright   2023 [employer] All rights reserved. Clinically reviewed by the [facility]. [employer] [id number] - REV 01/24.

## 2025-01-16 LAB
ALBUMIN SERPL BCG-MCNC: 4.1 G/DL (ref 3.5–5.2)
ALP SERPL-CCNC: 95 U/L (ref 40–150)
ALT SERPL W P-5'-P-CCNC: 18 U/L (ref 0–70)
ANION GAP SERPL CALCULATED.3IONS-SCNC: 11 MMOL/L (ref 7–15)
AST SERPL W P-5'-P-CCNC: 21 U/L (ref 0–45)
BILIRUB SERPL-MCNC: 0.4 MG/DL
BUN SERPL-MCNC: 19.3 MG/DL (ref 8–23)
CALCIUM SERPL-MCNC: 9.5 MG/DL (ref 8.8–10.4)
CHLORIDE SERPL-SCNC: 102 MMOL/L (ref 98–107)
CHOLEST SERPL-MCNC: 171 MG/DL
CREAT SERPL-MCNC: 1.15 MG/DL (ref 0.67–1.17)
EGFRCR SERPLBLD CKD-EPI 2021: 67 ML/MIN/1.73M2
FASTING STATUS PATIENT QL REPORTED: NO
FASTING STATUS PATIENT QL REPORTED: NO
GLUCOSE SERPL-MCNC: 98 MG/DL (ref 70–99)
HCO3 SERPL-SCNC: 26 MMOL/L (ref 22–29)
HDLC SERPL-MCNC: 41 MG/DL
LDLC SERPL CALC-MCNC: 110 MG/DL
NONHDLC SERPL-MCNC: 130 MG/DL
POTASSIUM SERPL-SCNC: 4.4 MMOL/L (ref 3.4–5.3)
PROT SERPL-MCNC: 7.4 G/DL (ref 6.4–8.3)
PSA SERPL DL<=0.01 NG/ML-MCNC: 6.47 NG/ML (ref 0–6.5)
SODIUM SERPL-SCNC: 139 MMOL/L (ref 135–145)
TRIGL SERPL-MCNC: 99 MG/DL
TSH SERPL DL<=0.005 MIU/L-ACNC: 1.05 UIU/ML (ref 0.3–4.2)

## 2025-01-16 NOTE — RESULT ENCOUNTER NOTE
"LDL \"bad\" cholesterol mildly elevated, similar to last time.      The hemoglobin a1c is in middle of \"prediabetes\" range, also similar to last time.    Advise increasing exercise as we discussed.    The prostate blood test PSA is very similar to last  year.  Of note, they redid the normal ranges for this test to account for the natural increase in PSA as we get older.  Thus, your level is now barely within the normal range for age.  The most important thing is that it has been stable for the last couple of years.      Other labs are fine.    Great to see you in clinic yesterday.    Madhu José MD      Please mail letter and results to patient   Thanks  Madhu José MD      "

## (undated) DEVICE — ESU LIGASURE OPEN SEALER/DIVIDER SM JAW 16.5MM LF1212A

## (undated) DEVICE — SU VICRYL 3-0 X-1 27" UND J458H

## (undated) DEVICE — CLIP HORIZON MED BLUE 002200

## (undated) DEVICE — ESU ELEC BLADE 2.75" COATED/INSULATED E1455

## (undated) DEVICE — LINEN TOWEL PACK X5 5464

## (undated) DEVICE — SU SILK 4-0 TIE 12X30" A303H

## (undated) DEVICE — BLADE KNIFE SURG 10 371110

## (undated) DEVICE — DRSG TELFA 3X8" 1238

## (undated) DEVICE — EYE PREP BETADINE 5% SOLUTION 30ML 0065-0411-30

## (undated) DEVICE — SUCTION MANIFOLD NEPTUNE 2 SYS 1 PORT 702-025-000

## (undated) DEVICE — DRSG TEGADERM 2 3/8X2 3/4" 1624W

## (undated) DEVICE — SU VICRYL 3-0 SH 8X18" UND J864D

## (undated) DEVICE — GLOVE PROTEXIS POWDER FREE SMT 8.0  2D72PT80X

## (undated) DEVICE — NIM ELEC SUBDERMAL NDL 3PAIR/BOX

## (undated) DEVICE — SPONGE KITTNER 30-101

## (undated) DEVICE — BLADE KNIFE SURG 12 371112

## (undated) DEVICE — ESU GROUND PAD ADULT W/CORD E7507

## (undated) DEVICE — SU SILK 2-0 TIE 12X30" A305H

## (undated) DEVICE — NIM PROBE PRASS INCREMENTING TIP 8225825

## (undated) DEVICE — RETR ELASTIC STAYS LONE STAR BLUNT DUAL LEAD 3550-1G

## (undated) DEVICE — SOL NACL 0.9% IRRIG 500ML BOTTLE 2F7123

## (undated) DEVICE — PREP SKIN SCRUB TRAY 4461A

## (undated) DEVICE — CLIP HORIZON SM RED WIDE SLOT 001201

## (undated) DEVICE — SU SILK 3-0 TIE 12X30" A304H

## (undated) DEVICE — SU SILK 2-0 SH 30" K833H

## (undated) DEVICE — SOL WATER IRRIG 500ML BOTTLE 2F7113

## (undated) DEVICE — PACK ENT MINOR CUSTOM ASC

## (undated) RX ORDER — GLYCOPYRROLATE 0.2 MG/ML
INJECTION INTRAMUSCULAR; INTRAVENOUS
Status: DISPENSED
Start: 2019-12-16

## (undated) RX ORDER — PROPOFOL 10 MG/ML
INJECTION, EMULSION INTRAVENOUS
Status: DISPENSED
Start: 2019-12-16

## (undated) RX ORDER — PHENYLEPHRINE HCL IN 0.9% NACL 1 MG/10 ML
SYRINGE (ML) INTRAVENOUS
Status: DISPENSED
Start: 2019-12-16

## (undated) RX ORDER — DEXAMETHASONE SODIUM PHOSPHATE 10 MG/ML
INJECTION, SOLUTION INTRAMUSCULAR; INTRAVENOUS
Status: DISPENSED
Start: 2019-12-16

## (undated) RX ORDER — EPHEDRINE SULFATE 50 MG/ML
INJECTION, SOLUTION INTRAMUSCULAR; INTRAVENOUS; SUBCUTANEOUS
Status: DISPENSED
Start: 2019-12-16

## (undated) RX ORDER — ACETAMINOPHEN 325 MG/1
TABLET ORAL
Status: DISPENSED
Start: 2019-12-16

## (undated) RX ORDER — CEFAZOLIN SODIUM 2 G/50ML
SOLUTION INTRAVENOUS
Status: DISPENSED
Start: 2019-12-16

## (undated) RX ORDER — FENTANYL CITRATE 50 UG/ML
INJECTION, SOLUTION INTRAMUSCULAR; INTRAVENOUS
Status: DISPENSED
Start: 2019-12-16

## (undated) RX ORDER — KETOROLAC TROMETHAMINE 30 MG/ML
INJECTION, SOLUTION INTRAMUSCULAR; INTRAVENOUS
Status: DISPENSED
Start: 2019-12-16

## (undated) RX ORDER — LIDOCAINE HYDROCHLORIDE 20 MG/ML
INJECTION, SOLUTION EPIDURAL; INFILTRATION; INTRACAUDAL; PERINEURAL
Status: DISPENSED
Start: 2019-12-16

## (undated) RX ORDER — ONDANSETRON 2 MG/ML
INJECTION INTRAMUSCULAR; INTRAVENOUS
Status: DISPENSED
Start: 2019-12-16